# Patient Record
Sex: FEMALE | Employment: FULL TIME | URBAN - METROPOLITAN AREA
[De-identification: names, ages, dates, MRNs, and addresses within clinical notes are randomized per-mention and may not be internally consistent; named-entity substitution may affect disease eponyms.]

---

## 2017-03-19 ENCOUNTER — APPOINTMENT (OUTPATIENT)
Dept: GENERAL RADIOLOGY | Facility: CLINIC | Age: 62
End: 2017-03-19
Attending: EMERGENCY MEDICINE
Payer: COMMERCIAL

## 2017-03-19 ENCOUNTER — HOSPITAL ENCOUNTER (EMERGENCY)
Facility: CLINIC | Age: 62
Discharge: HOME | End: 2017-03-19
Attending: EMERGENCY MEDICINE | Admitting: EMERGENCY MEDICINE
Payer: COMMERCIAL

## 2017-03-19 VITALS
HEIGHT: 64 IN | BODY MASS INDEX: 34.15 KG/M2 | HEART RATE: 86 BPM | WEIGHT: 200 LBS | TEMPERATURE: 99.1 F | SYSTOLIC BLOOD PRESSURE: 174 MMHG | DIASTOLIC BLOOD PRESSURE: 61 MMHG | OXYGEN SATURATION: 97 % | RESPIRATION RATE: 18 BRPM

## 2017-03-19 DIAGNOSIS — J20.9 ACUTE BRONCHITIS, UNSPECIFIED ORGANISM: ICD-10-CM

## 2017-03-19 DIAGNOSIS — J06.9 UPPER RESPIRATORY TRACT INFECTION, UNSPECIFIED TYPE: ICD-10-CM

## 2017-03-19 DIAGNOSIS — E87.6 HYPOKALEMIA: ICD-10-CM

## 2017-03-19 LAB
ANION GAP SERPL CALCULATED.3IONS-SCNC: 11 MMOL/L (ref 3–14)
BASOPHILS # BLD AUTO: 0.1 10E9/L (ref 0–0.2)
BASOPHILS NFR BLD AUTO: 0.6 %
BUN SERPL-MCNC: 15 MG/DL (ref 7–30)
CALCIUM SERPL-MCNC: 8.5 MG/DL (ref 8.5–10.1)
CHLORIDE SERPL-SCNC: 106 MMOL/L (ref 94–109)
CO2 SERPL-SCNC: 24 MMOL/L (ref 20–32)
CREAT SERPL-MCNC: 0.89 MG/DL (ref 0.52–1.04)
DIFFERENTIAL METHOD BLD: NORMAL
EOSINOPHIL # BLD AUTO: 0.1 10E9/L (ref 0–0.7)
EOSINOPHIL NFR BLD AUTO: 1.5 %
ERYTHROCYTE [DISTWIDTH] IN BLOOD BY AUTOMATED COUNT: 11.6 % (ref 10–15)
GFR SERPL CREATININE-BSD FRML MDRD: 64 ML/MIN/1.7M2
GLUCOSE SERPL-MCNC: 99 MG/DL (ref 70–99)
HCT VFR BLD AUTO: 43.2 % (ref 35–47)
HGB BLD-MCNC: 14.7 G/DL (ref 11.7–15.7)
IMM GRANULOCYTES # BLD: 0 10E9/L (ref 0–0.4)
IMM GRANULOCYTES NFR BLD: 0.4 %
LYMPHOCYTES # BLD AUTO: 1.6 10E9/L (ref 0.8–5.3)
LYMPHOCYTES NFR BLD AUTO: 20.2 %
MCH RBC QN AUTO: 31.5 PG (ref 26.5–33)
MCHC RBC AUTO-ENTMCNC: 34 G/DL (ref 31.5–36.5)
MCV RBC AUTO: 93 FL (ref 78–100)
MONOCYTES # BLD AUTO: 1.2 10E9/L (ref 0–1.3)
MONOCYTES NFR BLD AUTO: 14.7 %
NEUTROPHILS # BLD AUTO: 5 10E9/L (ref 1.6–8.3)
NEUTROPHILS NFR BLD AUTO: 62.6 %
NRBC # BLD AUTO: 0 10*3/UL
NRBC BLD AUTO-RTO: 0 /100
PLATELET # BLD AUTO: 196 10E9/L (ref 150–450)
POTASSIUM SERPL-SCNC: 3.1 MMOL/L (ref 3.4–5.3)
RBC # BLD AUTO: 4.66 10E12/L (ref 3.8–5.2)
SODIUM SERPL-SCNC: 141 MMOL/L (ref 133–144)
WBC # BLD AUTO: 8 10E9/L (ref 4–11)

## 2017-03-19 PROCEDURE — 96360 HYDRATION IV INFUSION INIT: CPT

## 2017-03-19 PROCEDURE — 85025 COMPLETE CBC W/AUTO DIFF WBC: CPT | Performed by: EMERGENCY MEDICINE

## 2017-03-19 PROCEDURE — 99285 EMERGENCY DEPT VISIT HI MDM: CPT | Mod: 25

## 2017-03-19 PROCEDURE — 94640 AIRWAY INHALATION TREATMENT: CPT

## 2017-03-19 PROCEDURE — 93005 ELECTROCARDIOGRAM TRACING: CPT

## 2017-03-19 PROCEDURE — 71020 XR CHEST 2 VW: CPT

## 2017-03-19 PROCEDURE — 96361 HYDRATE IV INFUSION ADD-ON: CPT

## 2017-03-19 PROCEDURE — 25000132 ZZH RX MED GY IP 250 OP 250 PS 637: Performed by: EMERGENCY MEDICINE

## 2017-03-19 PROCEDURE — 80048 BASIC METABOLIC PNL TOTAL CA: CPT | Performed by: EMERGENCY MEDICINE

## 2017-03-19 PROCEDURE — 25000125 ZZHC RX 250: Performed by: EMERGENCY MEDICINE

## 2017-03-19 PROCEDURE — 25000132 ZZH RX MED GY IP 250 OP 250 PS 637

## 2017-03-19 PROCEDURE — 94640 AIRWAY INHALATION TREATMENT: CPT | Mod: 76

## 2017-03-19 PROCEDURE — 25000128 H RX IP 250 OP 636: Performed by: EMERGENCY MEDICINE

## 2017-03-19 RX ORDER — ALBUTEROL SULFATE 90 UG/1
2 AEROSOL, METERED RESPIRATORY (INHALATION) EVERY 4 HOURS PRN
Qty: 1 INHALER | Refills: 0 | Status: SHIPPED | OUTPATIENT
Start: 2017-03-19 | End: 2018-01-15

## 2017-03-19 RX ORDER — POTASSIUM CHLORIDE 1500 MG/1
40 TABLET, EXTENDED RELEASE ORAL ONCE
Status: COMPLETED | OUTPATIENT
Start: 2017-03-19 | End: 2017-03-19

## 2017-03-19 RX ORDER — AZELASTINE 1 MG/ML
1 SPRAY, METERED NASAL 2 TIMES DAILY
COMMUNITY
End: 2018-01-15

## 2017-03-19 RX ORDER — PREDNISONE 20 MG/1
60 TABLET ORAL ONCE
Status: COMPLETED | OUTPATIENT
Start: 2017-03-19 | End: 2017-03-19

## 2017-03-19 RX ORDER — ACETAMINOPHEN 325 MG/1
TABLET ORAL
Status: COMPLETED
Start: 2017-03-19 | End: 2017-03-19

## 2017-03-19 RX ORDER — PREDNISONE 20 MG/1
40 TABLET ORAL DAILY
Qty: 10 TABLET | Refills: 0 | Status: SHIPPED | OUTPATIENT
Start: 2017-03-19 | End: 2017-03-24

## 2017-03-19 RX ORDER — HYDROCODONE BITARTRATE AND ACETAMINOPHEN 5; 325 MG/1; MG/1
1-2 TABLET ORAL EVERY 4 HOURS PRN
Qty: 15 TABLET | Refills: 0 | Status: SHIPPED | OUTPATIENT
Start: 2017-03-19 | End: 2017-08-04

## 2017-03-19 RX ORDER — OXYCODONE HYDROCHLORIDE 5 MG/1
5 TABLET ORAL ONCE
Status: COMPLETED | OUTPATIENT
Start: 2017-03-19 | End: 2017-03-19

## 2017-03-19 RX ORDER — SODIUM CHLORIDE 9 MG/ML
1000 INJECTION, SOLUTION INTRAVENOUS CONTINUOUS
Status: DISCONTINUED | OUTPATIENT
Start: 2017-03-19 | End: 2017-03-20 | Stop reason: HOSPADM

## 2017-03-19 RX ORDER — ACETAMINOPHEN 325 MG/1
650 TABLET ORAL ONCE
Status: COMPLETED | OUTPATIENT
Start: 2017-03-19 | End: 2017-03-19

## 2017-03-19 RX ORDER — GUAIFENESIN AND CODEINE PHOSPHATE 100; 10 MG/5ML; MG/5ML
10 SOLUTION ORAL
Qty: 120 ML | Refills: 0 | Status: SHIPPED | OUTPATIENT
Start: 2017-03-19 | End: 2017-03-19

## 2017-03-19 RX ORDER — LIDOCAINE 40 MG/G
CREAM TOPICAL
Status: DISCONTINUED | OUTPATIENT
Start: 2017-03-19 | End: 2017-03-20 | Stop reason: HOSPADM

## 2017-03-19 RX ORDER — IPRATROPIUM BROMIDE AND ALBUTEROL SULFATE 2.5; .5 MG/3ML; MG/3ML
3 SOLUTION RESPIRATORY (INHALATION)
Status: COMPLETED | OUTPATIENT
Start: 2017-03-19 | End: 2017-03-19

## 2017-03-19 RX ADMIN — IPRATROPIUM BROMIDE AND ALBUTEROL SULFATE 3 ML: .5; 3 SOLUTION RESPIRATORY (INHALATION) at 18:31

## 2017-03-19 RX ADMIN — POTASSIUM CHLORIDE 40 MEQ: 1500 TABLET, EXTENDED RELEASE ORAL at 22:00

## 2017-03-19 RX ADMIN — SODIUM CHLORIDE 1000 ML: 9 INJECTION, SOLUTION INTRAVENOUS at 19:57

## 2017-03-19 RX ADMIN — ACETAMINOPHEN 650 MG: 325 TABLET ORAL at 18:16

## 2017-03-19 RX ADMIN — PREDNISONE 60 MG: 20 TABLET ORAL at 19:57

## 2017-03-19 RX ADMIN — IPRATROPIUM BROMIDE AND ALBUTEROL SULFATE 3 ML: .5; 3 SOLUTION RESPIRATORY (INHALATION) at 17:57

## 2017-03-19 RX ADMIN — OXYCODONE HYDROCHLORIDE 5 MG: 5 TABLET ORAL at 21:59

## 2017-03-19 RX ADMIN — IPRATROPIUM BROMIDE AND ALBUTEROL SULFATE 3 ML: .5; 3 SOLUTION RESPIRATORY (INHALATION) at 18:24

## 2017-03-19 RX ADMIN — ACETAMINOPHEN 650 MG: 325 TABLET, FILM COATED ORAL at 18:16

## 2017-03-19 ASSESSMENT — ENCOUNTER SYMPTOMS
COUGH: 1
FEVER: 0
NAUSEA: 0
DIZZINESS: 1
CHILLS: 0
VOMITING: 0
SHORTNESS OF BREATH: 1
FATIGUE: 1

## 2017-03-19 NOTE — ED NOTES
Coughing and shortness of breath for the past 3 days.  Patient alert and oriented x3.  Airway, breathing and circulation intact.

## 2017-03-19 NOTE — ED AVS SNAPSHOT
[facility]    [address]    [facility] [address]    Phone:  [phone]    Fax:  [phone]                                       Lilia RAMIREZ   MRN: [id number]    Department:  [facility]   Date of Visit:  3/19/2017           After Visit Summary Signature Page     I have received my discharge instructions, and my questions have been answered. I have discussed any challenges I see with this plan with the nurse or doctor.    ..........................................................................................................................................  Patient/Patient Representative Signature      ..........................................................................................................................................  Patient Representative Print Name and Relationship to Patient    ..................................................               ................................................  Date                                            Time    ..........................................................................................................................................  Reviewed by Signature/Title    ...................................................              ..............................................  Date                                                            Time

## 2017-03-19 NOTE — ED PROVIDER NOTES
History     Chief Complaint:  Cough    HPI   Lilia LILLY is a 61 year old female with a history of hypertension and chronic sinus issues who presents with daughter to the emergency department today for evaluation of cough. Ms. Lilly reports for the last 3 days she has been experiencing dry cough with shortness of breath. Yesterday, patient began to experience lightheadedness and fatigue. Of note, patient notes current symptoms are not similar to her chronic sinus infections. Due to worsening symptoms, patient comes in for evaluation. Patient has been taking Tylenol with last today after returning from work. She also reports pain in her lateral bilateral chest wall and back with coughing. Patient notes she hasn't smoked for the last 2 days due to symptoms. No history of inhaler use or diagnosis of asthma although reports that she was on prednisone in December for bronchitis. The patient denies any fever, chills, diaphoresis,  chest pain, nausea, vomiting, or leg swelling or pain. She reports no recent illness or sick contacts.    Allergies:  NKDA     Medications:    Lisinopril Po  Paroxetine Hcl (Paxil Po)  Clonidine Hcl Po  Fluticasone (Veramyst) 27.5 Mcg/Spray Spray  Ascorbic Acid (Vitamin C Po)  Vitamin D, Cholecalciferol, Po     Past Medical History:    Hypertension    Past Surgical History:    Cholecystectomy    Family History:    History reviewed. No pertinent family history.    Social History:  Marital Status:   [4]  Tobacco: Negative  Alcohol: Negative  PCP: [facility]  Presents with daughter    Review of Systems   Constitutional: Positive for fatigue. Negative for chills and fever.   Respiratory: Positive for cough and shortness of breath.    Cardiovascular: Negative for chest pain and leg swelling.   Gastrointestinal: Negative for nausea and vomiting.   Neurological: Positive for dizziness.   All other systems reviewed and are negative.    Physical Exam   First Vitals:  BP:  "165/90  Pulse: 98  Heart Rate: 98  Temp: 97.3  F (36.3  C)  Resp: 22  Height: 162.6 cm (5' 4\")  Weight: 90.7 kg (200 lb)  SpO2: 98 %    Physical Exam  Nursing note and vitals reviewed.    Constitutional: Pleasant and well groomed. Appears to feel poorly. Frequent dry cough.          HENT:    Mouth/Throat: Oropharynx is without swelling or erythema. Oral mucosa moist.    Eyes: Conjunctivae normal are normal. No scleral icterus.    Neck: Neck supple.    Ears:  Left TM injected.   Cardiovascular: Normal rate, regular rhythm and intact distal pulses.    Pulmonary/Chest: Diffuse end expiratory wheezing.   Abdominal: Soft.  No distension. There is no tenderness.   Musculoskeletal:  No edema, No calf tenderness  Neurological:Alert and oriented. Coordination normal. Upper and lower extremity strength intact throughout.   Skin: Skin is warm and dry. No rash.   Psychiatric: Normal mood and affect.     Emergency Department Course   Imaging:  Chest X-Ray. 2 Views:   No active infiltrate.   Final reading per radiology.     Laboratory:  CBC:  WBC 8.0, HGB 14.7, , otherwise WNL  BMP: Potassium 3.1 (L) o/w WNL. (Creatinine 0.89)    Interventions:  17:57 Duoneb, 3 mL, nebulization   18:16 Tylenol 650 mg Oral  18:24 Duoneb, 3 mL, nebulization   18:31 Duoneb, 3 mL, nebulization   19:57 Prednisone 60 mg Oral  21:59 Oxycodone 5 mg Oral  22:00 Potassium Chloride 40 mEq Oral    Emergency Department Course:  Nursing notes and vitals reviewed.    17:19 I performed an exam of the patient as documented above.     The patient received the intervention(s) above.    18:18 Nurse reports patient's symptoms did not improve after Duo Neb. Patient also reports that her symptoms are similar to her sinus infection, which she denied at first.     18:21 I reevaluated the patient. Patient now has increased wheezing after the first Neb.     19:22 I reevaluated the patient. Her wheezing resolved, but now patient may have subtle rales in the left " base. She reports to continue to feel poorly.     20:00 Blood drawn. This was sent to the lab for further testing, results above.    The patient was taken for x-ray, see imaging results above.     20:21 Recheck patient.     21:09 Recheck patient. I personally reviewed the laboratory results with the Patient and daughter and answered all related questions prior to discharge. Plan explained to the Patient and daughter. Patient discharged home with instructions regarding supportive care, medications, and reasons to return. The importance of close follow-up was reviewed.  Impression & Plan    Medical Decision Making:  Lilia RAMIREZ is a 61 year old female presents with cough, sinus congestion, pain in her chest and back from coughing and feeling wheezy and short of breath as described above. Differential diagnosis included, but not limited to, reactive airway disease, early COPD, bronchitis, pneumonia, viral syndrome, and sinusitis. ED evaluation is as noted above. She had resolution of the wheezing with 3 Nebs in the ED, but still reported the cough persistent and feeling lousy as well as lightheaded. After IV fluids, the lightheaded improved although the cough continued to be bothersome. The chest x-0ray did not show pneumonia, which was obtained with the possible rales auscultated after the wheezing resolved. Her labs revealed mild hypokalemia and potassium was administered. She was instructed on the use of albuterol MDI with a spaced. We instructed on the importance of working on smoking cessation and recommended close follow up with PCP in the next few days for repeat assessment.      Diagnosis:    ICD-10-CM    1. Acute bronchitis, unspecified organism J20.9 Basic metabolic panel   2. Upper respiratory tract infection, unspecified type J06.9    3. Hypokalemia E87.6     mild       Disposition:  discharged to home    Discharge Medications:  New Prescriptions    ALBUTEROL (ALBUTEROL) 108 (90 BASE) MCG/ACT INHALER     Inhale 2 puffs into the lungs every 4 hours as needed for shortness of breath / dyspnea    HYDROCODONE-ACETAMINOPHEN (NORCO) 5-325 MG PER TABLET    Take 1-2 tablets by mouth every 4 hours as needed for moderate to severe pain    PREDNISONE (DELTASONE) 20 MG TABLET    Take 2 tablets (40 mg) by mouth daily for 5 days       Scribe Disclosure:  I, Peggy Guajardo, am serving as a scribe at 5:19 PM on 3/19/2017 to document services personally performed by Rocío Leone MD, based on my observations and the provider's statements to me.  Peggy Guajardo  3/19/2017   [facility] EMERGENCY DEPARTMENT       Rocío Leone MD  03/19/17 8921

## 2017-08-04 ENCOUNTER — RADIANT APPOINTMENT (OUTPATIENT)
Dept: MAMMOGRAPHY | Facility: CLINIC | Age: 62
End: 2017-08-04
Attending: PEDIATRICS
Payer: COMMERCIAL

## 2017-08-04 ENCOUNTER — OFFICE VISIT (OUTPATIENT)
Dept: PEDIATRICS | Facility: CLINIC | Age: 62
End: 2017-08-04
Payer: COMMERCIAL

## 2017-08-04 VITALS
HEART RATE: 73 BPM | BODY MASS INDEX: 36.7 KG/M2 | HEIGHT: 64 IN | OXYGEN SATURATION: 96 % | SYSTOLIC BLOOD PRESSURE: 130 MMHG | DIASTOLIC BLOOD PRESSURE: 80 MMHG | WEIGHT: 215 LBS | TEMPERATURE: 98.5 F

## 2017-08-04 DIAGNOSIS — Z13.220 LIPID SCREENING: ICD-10-CM

## 2017-08-04 DIAGNOSIS — Z12.31 VISIT FOR SCREENING MAMMOGRAM: ICD-10-CM

## 2017-08-04 DIAGNOSIS — Z00.00 ROUTINE GENERAL MEDICAL EXAMINATION AT A HEALTH CARE FACILITY: Primary | ICD-10-CM

## 2017-08-04 DIAGNOSIS — Z23 NEED FOR ZOSTER VACCINATION: ICD-10-CM

## 2017-08-04 DIAGNOSIS — E66.01 MORBID OBESITY, UNSPECIFIED OBESITY TYPE (H): ICD-10-CM

## 2017-08-04 DIAGNOSIS — I10 BENIGN ESSENTIAL HYPERTENSION: ICD-10-CM

## 2017-08-04 DIAGNOSIS — R61 GENERALIZED HYPERHIDROSIS: ICD-10-CM

## 2017-08-04 DIAGNOSIS — J31.0 NON-ALLERGIC RHINITIS: ICD-10-CM

## 2017-08-04 DIAGNOSIS — K51.019 ULCERATIVE PANCOLITIS WITH COMPLICATION (H): ICD-10-CM

## 2017-08-04 DIAGNOSIS — F33.1 MAJOR DEPRESSIVE DISORDER, RECURRENT EPISODE, MODERATE (H): ICD-10-CM

## 2017-08-04 PROBLEM — R73.03 PREDIABETES: Status: ACTIVE | Noted: 2017-08-04

## 2017-08-04 PROBLEM — G47.33 OBSTRUCTIVE SLEEP APNEA: Status: ACTIVE | Noted: 2017-08-02

## 2017-08-04 PROBLEM — H90.3 SENSORINEURAL HEARING LOSS, ASYMMETRICAL: Status: ACTIVE | Noted: 2017-04-13

## 2017-08-04 PROBLEM — H93.12 LEFT-SIDED TINNITUS: Status: ACTIVE | Noted: 2017-04-13

## 2017-08-04 LAB
ANION GAP SERPL CALCULATED.3IONS-SCNC: 7 MMOL/L (ref 3–14)
BUN SERPL-MCNC: 13 MG/DL (ref 7–30)
CALCIUM SERPL-MCNC: 9.1 MG/DL (ref 8.5–10.1)
CHLORIDE SERPL-SCNC: 104 MMOL/L (ref 94–109)
CHOLEST SERPL-MCNC: 224 MG/DL
CO2 SERPL-SCNC: 28 MMOL/L (ref 20–32)
CREAT SERPL-MCNC: 0.86 MG/DL (ref 0.52–1.04)
GFR SERPL CREATININE-BSD FRML MDRD: 67 ML/MIN/1.7M2
GLUCOSE SERPL-MCNC: 102 MG/DL (ref 70–99)
HDLC SERPL-MCNC: 50 MG/DL
LDLC SERPL CALC-MCNC: 133 MG/DL
NONHDLC SERPL-MCNC: 174 MG/DL
POTASSIUM SERPL-SCNC: 4.3 MMOL/L (ref 3.4–5.3)
SODIUM SERPL-SCNC: 139 MMOL/L (ref 133–144)
TRIGL SERPL-MCNC: 206 MG/DL

## 2017-08-04 PROCEDURE — 80048 BASIC METABOLIC PNL TOTAL CA: CPT | Performed by: PEDIATRICS

## 2017-08-04 PROCEDURE — 99386 PREV VISIT NEW AGE 40-64: CPT | Mod: 25 | Performed by: PEDIATRICS

## 2017-08-04 PROCEDURE — 36415 COLL VENOUS BLD VENIPUNCTURE: CPT | Performed by: PEDIATRICS

## 2017-08-04 PROCEDURE — 80061 LIPID PANEL: CPT | Performed by: PEDIATRICS

## 2017-08-04 PROCEDURE — 90471 IMMUNIZATION ADMIN: CPT | Performed by: PEDIATRICS

## 2017-08-04 PROCEDURE — G0202 SCR MAMMO BI INCL CAD: HCPCS | Mod: TC

## 2017-08-04 PROCEDURE — 90736 HZV VACCINE LIVE SUBQ: CPT | Performed by: PEDIATRICS

## 2017-08-04 RX ORDER — CLONIDINE HYDROCHLORIDE 0.2 MG/1
0.2 TABLET ORAL 2 TIMES DAILY
Qty: 180 TABLET | Refills: 3 | Status: SHIPPED | OUTPATIENT
Start: 2017-08-04 | End: 2018-08-06

## 2017-08-04 RX ORDER — PAROXETINE 40 MG/1
40 TABLET, FILM COATED ORAL AT BEDTIME
Qty: 90 TABLET | Refills: 1 | Status: SHIPPED | OUTPATIENT
Start: 2017-08-04 | End: 2017-09-25

## 2017-08-04 RX ORDER — LISINOPRIL 10 MG/1
10 TABLET ORAL DAILY
Qty: 90 TABLET | Refills: 3 | Status: SHIPPED | OUTPATIENT
Start: 2017-08-04 | End: 2017-09-22 | Stop reason: SINTOL

## 2017-08-04 NOTE — NURSING NOTE
"Chief Complaint   Patient presents with     Physical     Establish Care       Initial /80 (BP Location: Right arm, Cuff Size: Adult Large)  Pulse 73  Temp 98.5  F (36.9  C) (Oral)  Ht 5' 4\" (1.626 m)  Wt 215 lb (97.5 kg)  SpO2 96%  BMI 36.9 kg/m2 Estimated body mass index is 36.9 kg/(m^2) as calculated from the following:    Height as of this encounter: 5' 4\" (1.626 m).    Weight as of this encounter: 215 lb (97.5 kg).  Medication Reconciliation: complete   Kenia Arora MA    1.  Has the patient received the information for the Zostavax vaccine? YES    2.  Does the patient have any of the following contraindications?     Allergy to Neomycin or Gelatin? No       Current moderate or severe illness? No  Temp = 98.5  If temp > 99.0 degrees orally or patient has above allergies, vaccine is deferred    3.  The vaccine has been administered in the usual fashion and the patient was instructed to wait 20 minutes before leaving the building in the event of an allergic reaction: YES    Vaccination given by Kenia Arora MA  .  Recorded by Kenia Arora        "

## 2017-08-04 NOTE — MR AVS SNAPSHOT
After Visit Summary   8/4/2017    Lilia RAMIREZ    MRN: [id number]           Patient Information     Date Of Birth          1955        Visit Information        Provider Department      8/4/2017 8:40 AM Teagan Ann MD [facility]        Today's Diagnoses     Visit for screening mammogram    -  1    Lipid screening        Benign essential hypertension        Generalized hyperhidrosis        Major depressive disorder, recurrent episode, moderate (H)        Non-allergic rhinitis          Care Instructions    Schedule mammogram at [facility] [phone]      Preventive Health Recommendations  Female Ages 50 - 64    Yearly exam: See your health care provider every year in order to  o Review health changes.   o Discuss preventive care.    o Review your medicines if your doctor has prescribed any.      Get a Pap test every three years (unless you have an abnormal result and your provider advises testing more often).    If you get Pap tests with HPV test, you only need to test every 5 years, unless you have an abnormal result.     You do not need a Pap test if your uterus was removed (hysterectomy) and you have not had cancer.    You should be tested each year for STDs (sexually transmitted diseases) if you're at risk.     Have a mammogram every 1 to 2 years.    Have a colonoscopy at age 50, or have a yearly FIT test (stool test). These exams screen for colon cancer.      Have a cholesterol test every 5 years, or more often if advised.    Have a diabetes test (fasting glucose) every three years. If you are at risk for diabetes, you should have this test more often.     If you are at risk for osteoporosis (brittle bone disease), think about having a bone density scan (DEXA).    Shots: Get a flu shot each year. Get a tetanus shot every 10 years.    Nutrition:     Eat at least 5 servings of fruits and vegetables each day.    Eat whole-grain bread, whole-wheat pasta and brown  "rice instead of white grains and rice.    Talk to your provider about Calcium and Vitamin D.     Lifestyle    Exercise at least 150 minutes a week (30 minutes a day, 5 days a week). This will help you control your weight and prevent disease.    Limit alcohol to one drink per day.    No smoking.     Wear sunscreen to prevent skin cancer.     See your dentist every six months for an exam and cleaning.    See your eye doctor every 1 to 2 years.            Follow-ups after your visit        Future tests that were ordered for you today     Open Future Orders        Priority Expected Expires Ordered    MA Screening Digital Bilateral Routine  8/4/2018 8/4/2017            Who to contact     If you have questions or need follow up information about today's clinic visit or your schedule please contact [facility] directly at [phone].  Normal or non-critical lab and imaging results will be communicated to you by MyChart, letter or phone within 4 business days after the clinic has received the results. If you do not hear from us within 7 days, please contact the clinic through MyChart or phone. If you have a critical or abnormal lab result, we will notify you by phone as soon as possible.  Submit refill requests through [employer] or call your pharmacy and they will forward the refill request to us. Please allow 3 business days for your refill to be completed.          Additional Information About Your Visit        [employer] Information     [employer] lets you send messages to your doctor, view your test results, renew your prescriptions, schedule appointments and more. To sign up, go to [email or web address] . Click on \"Log in\" on the left side of the screen, which will take you to the Welcome page. Then click on \"Sign up Now\" on the right side of the page.     You will be asked to enter the access code listed below, as well as some personal information. Please follow the directions to create your username and " "password.     Your access code is: [email or web address]  Expires: 2017  9:36 AM     Your access code will  in 90 days. If you need help or a new code, please call your [facility] clinic or [phone].        Care EveryWhere ID     This is your Care EveryWhere ID. This could be used by other organizations to access your [facility] medical records  [id number]        Your Vitals Were     Pulse Temperature Height Pulse Oximetry BMI (Body Mass Index)       73 98.5  F (36.9  C) (Oral) 5' 4\" (1.626 m) 96% 36.9 kg/m2        Blood Pressure from Last 3 Encounters:   17 150/80   17 174/61    Weight from Last 3 Encounters:   17 215 lb (97.5 kg)   17 200 lb (90.7 kg)              We Performed the Following     Basic metabolic panel     Lipid Profile (Chol, Trig, HDL, LDL calc)          Today's Medication Changes          These changes are accurate as of: 17  9:36 AM.  If you have any questions, ask your nurse or doctor.               These medicines have changed or have updated prescriptions.        Dose/Directions    * CLONIDINE HCL PO   This may have changed:  Another medication with the same name was added. Make sure you understand how and when to take each.        Refills:  0       * cloNIDine 0.2 MG tablet   Commonly known as:  CATAPRES   This may have changed:  You were already taking a medication with the same name, and this prescription was added. Make sure you understand how and when to take each.   Used for:  Generalized hyperhidrosis   Changed by:  Teagan Ann MD        Dose:  0.2 mg   Take 1 tablet (0.2 mg) by mouth 2 times daily   Quantity:  180 tablet   Refills:  3       * fluticasone 27.5 MCG/SPRAY spray   Commonly known as:  VERAMYST   This may have changed:  Another medication with the same name was added. Make sure you understand how and when to take each.        Dose:  2 spray   Spray 2 sprays into both nostrils daily   Refills:  0       * fluticasone 27.5 MCG/SPRAY spray "   Commonly known as:  VERAMYST   This may have changed:  You were already taking a medication with the same name, and this prescription was added. Make sure you understand how and when to take each.   Used for:  Non-allergic rhinitis   Changed by:  Teagan Ann MD        Dose:  1-2 spray   Spray 1-2 sprays into both nostrils daily   Quantity:  10 g   Refills:  11       * LISINOPRIL PO   This may have changed:  Another medication with the same name was added. Make sure you understand how and when to take each.        Refills:  0       * lisinopril 10 MG tablet   Commonly known as:  PRINIVIL/ZESTRIL   This may have changed:  You were already taking a medication with the same name, and this prescription was added. Make sure you understand how and when to take each.   Used for:  Benign essential hypertension   Changed by:  Teagan Ann MD        Dose:  10 mg   Take 1 tablet (10 mg) by mouth daily   Quantity:  90 tablet   Refills:  3       * PAXIL PO   This may have changed:  Another medication with the same name was added. Make sure you understand how and when to take each.        Take by mouth daily   Refills:  0       * PARoxetine 40 MG tablet   Commonly known as:  PAXIL   This may have changed:  You were already taking a medication with the same name, and this prescription was added. Make sure you understand how and when to take each.   Used for:  Major depressive disorder, recurrent episode, moderate (H)   Changed by:  Teagan Ann MD        Dose:  40 mg   Take 1 tablet (40 mg) by mouth At Bedtime   Quantity:  90 tablet   Refills:  1       * Notice:  This list has 8 medication(s) that are the same as other medications prescribed for you. Read the directions carefully, and ask your doctor or other care provider to review them with you.         Where to get your medicines      These medications were sent to [id number] - [place], [place] - [address] AT [facility]  [address], [place] [address]     Phone:  [phone]     cloNIDine 0.2 MG tablet    fluticasone 27.5 MCG/SPRAY spray    lisinopril 10 MG tablet    PARoxetine 40 MG tablet                Primary Care Provider Office Phone # Fax #    Bridget Pineda [phone]       [facility] [address]  [facility] [address]        Equal Access to Services     MAURICIO JORGENSEN : Hadii aad ku hadasho Soomaali, waaxda luqadaha, qaybta kaalmada adeegyada, waxay idiin hayaan adeeg kharash la'aan . So [facility] [phone].    ATENCIÓN: Si nathaliala espsamra, tiene a wyatt disposición servicios gratuitos de asistencia lingüística. Jett al [phone].    We comply with applicable federal civil rights laws and [place] laws. We do not discriminate on the basis of race, color, national origin, age, disability sex, sexual orientation or gender identity.            Thank you!     Thank you for choosing [facility]  for your care. Our goal is always to provide you with excellent care. Hearing back from our patients is one way we can continue to improve our services. Please take a few minutes to complete the written survey that you may receive in the mail after your visit with us. Thank you!             Your Updated Medication List - Protect others around you: Learn how to safely use, store and throw away your medicines at www.disposemymeds.org.          This list is accurate as of: 8/4/17  9:36 AM.  Always use your most recent med list.                   Brand Name Dispense Instructions for use Diagnosis    albuterol 108 (90 BASE) MCG/ACT Inhaler    albuterol    1 Inhaler    Inhale 2 puffs into the lungs every 4 hours as needed for shortness of breath / dyspnea        azelastine 0.1 % spray    ASTELIN     Spray 1 spray into both nostrils 2 times daily        * CLONIDINE HCL PO           * cloNIDine 0.2 MG tablet    CATAPRES    180 tablet    Take 1 tablet (0.2 mg) by mouth 2 times daily    Generalized  hyperhidrosis       * fluticasone 27.5 MCG/SPRAY spray    VERAMYST     Spray 2 sprays into both nostrils daily        * fluticasone 27.5 MCG/SPRAY spray    VERAMYST    10 g    Spray 1-2 sprays into both nostrils daily    Non-allergic rhinitis       * LISINOPRIL PO           * lisinopril 10 MG tablet    PRINIVIL/ZESTRIL    90 tablet    Take 1 tablet (10 mg) by mouth daily    Benign essential hypertension       * PAXIL PO      Take by mouth daily        * PARoxetine 40 MG tablet    PAXIL    90 tablet    Take 1 tablet (40 mg) by mouth At Bedtime    Major depressive disorder, recurrent episode, moderate (H)       VITAMIN C PO           VITAMIN D (CHOLECALCIFEROL) PO      Take by mouth daily        * Notice:  This list has 8 medication(s) that are the same as other medications prescribed for you. Read the directions carefully, and ask your doctor or other care provider to review them with you.

## 2017-08-04 NOTE — PROGRESS NOTES
SUBJECTIVE:   CC: Lilia RAMIREZ is an 62 year old woman who presents for preventive health visit.     Physical   Annual:     Getting at least 3 servings of Calcium per day::  Yes    Bi-annual eye exam::  Yes    Dental care twice a year::  Yes    Sleep apnea or symptoms of sleep apnea::  Sleep apnea    Frequency of exercise::  1 day/week    Duration of exercise::  Greater than 60 minutes    Taking medications regularly::  Yes    Medication side effects::  Muscle aches and Significant flushing      Patient new to clinic, switching from [facility].    -Had mammo and PAP last year at [facility].    -Patient states she has ulcerative colitis - but cannot recall which GI she previously went to or when her last colonoscopy was.    She also has depression for which she takes paxil and has been stable.  Hypertension - stable on lisinopril - states with higher doses she gets a cough  Has chronic sinus issues - has seen ENT in the past - using flonase.  Hyperhydrosis controlled with clonidine      Today's PHQ-2 Score:   PHQ-2 ( 1999 Pfizer) 8/4/2017   Q1: Little interest or pleasure in doing things 1   Q2: Feeling down, depressed or hopeless 1   PHQ-2 Score 2   Q1: Little interest or pleasure in doing things Several days   Q2: Feeling down, depressed or hopeless Several days   PHQ-2 Score 2       Abuse: Current or Past(Physical, Sexual or Emotional)- No  Do you feel safe in your environment - Yes    Social History   Substance Use Topics     Smoking status: Former Smoker     Types: Cigarettes     Quit date: 7/21/2017     Smokeless tobacco: Never Used     Alcohol use Yes      Comment: social drinker     The patient does not drink >3 drinks per day nor >7 drinks per week.    Reviewed orders with patient.  Reviewed health maintenance and updated orders accordingly - Yes      Patient over age 50, mutual decision to screen reflected in health maintenance.      Pertinent mammograms are reviewed under the imaging tab.  History of abnormal  "Pap smear: NO - age 30- 65 PAP every 3 years recommended    Reviewed and updated as needed this visit by clinical staff  Tobacco  Allergies  Meds  Med Hx  Surg Hx  Fam Hx  Soc Hx        Reviewed and updated as needed this visit by Provider            ROS:  C: NEGATIVE for fever, chills, change in weight  I: NEGATIVE for worrisome rashes, moles or lesions  E: NEGATIVE for vision changes or irritation  ENT: NEGATIVE for ear, mouth and throat problems  R: NEGATIVE for significant cough or SOB  B: NEGATIVE for masses, tenderness or discharge  CV: NEGATIVE for chest pain, palpitations or peripheral edema  GI: NEGATIVE for nausea, abdominal pain, heartburn, or change in bowel habits  : NEGATIVE for unusual urinary or vaginal symptoms. No vaginal bleeding.  M: NEGATIVE for significant arthralgias or myalgia  N: NEGATIVE for weakness, dizziness or paresthesias  P: NEGATIVE for changes in mood or affect      OBJECTIVE:   /80  Pulse 73  Temp 98.5  F (36.9  C) (Oral)  Ht 5' 4\" (1.626 m)  Wt 215 lb (97.5 kg)  SpO2 96%  BMI 36.9 kg/m2  EXAM:  GENERAL APPEARANCE: healthy, alert and no distress  EYES: Eyes grossly normal to inspection, PERRL and conjunctivae and sclerae normal  HENT: ear canals and TM's normal, nose and mouth without ulcers or lesions, oropharynx clear and oral mucous membranes moist  NECK: no adenopathy, no asymmetry, masses, or scars and thyroid normal to palpation  RESP: lungs clear to auscultation - no rales, rhonchi or wheezes  BREAST: normal without masses, tenderness or nipple discharge and no palpable axillary masses or adenopathy  CV: regular rate and rhythm, normal S1 S2, no S3 or S4, no murmur, click or rub, no peripheral edema and peripheral pulses strong  ABDOMEN: soft, nontender, no hepatosplenomegaly, no masses and bowel sounds normal  MS: no musculoskeletal defects are noted and gait is age appropriate without ataxia  SKIN: no suspicious lesions or rashes  NEURO: Normal strength " and tone, sensory exam grossly normal, mentation intact and speech normal  PSYCH: mentation appears normal and affect normal/bright    ASSESSMENT/PLAN:   1. Routine general medical examination at a health care facility    Patient mentions she would like to consider bariatric surgery, but her insurance won't cover lap banding - advised her to call back to see if they would cover any and then we can refer to bariatric.  She has BMI >35 with comorbidity=morbid obesity.    2. Major depressive disorder, recurrent episode, moderate (H)  Controlled, follow up in 6 months.  - PARoxetine (PAXIL) 40 MG tablet; Take 1 tablet (40 mg) by mouth At Bedtime  Dispense: 90 tablet; Refill: 1    3. Ulcerative pancolitis with complication (H)  Patient to find out previous GI and return [facility]. She does states she never wants to do colonoscopy again - was very painful.    4. Visit for screening mammogram  - MA Screening Digital Bilateral; Future    5. Lipid screening  - Lipid Profile (Chol, Trig, HDL, LDL calc)    6. Benign essential hypertension  Controlled, refill  - Basic metabolic panel  - lisinopril (PRINIVIL/ZESTRIL) 10 MG tablet; Take 1 tablet (10 mg) by mouth daily  Dispense: 90 tablet; Refill: 3    7. Generalized hyperhidrosis  refill  - cloNIDine (CATAPRES) 0.2 MG tablet; Take 1 tablet (0.2 mg) by mouth 2 times daily  Dispense: 180 tablet; Refill: 3    8. Non-allergic rhinitis  refill  - fluticasone (VERAMYST) 27.5 MCG/SPRAY spray; Spray 1-2 sprays into both nostrils daily  Dispense: 10 g; Refill: 11    9. Need for zoster vaccination  - ZOSTER VACC LIVE SUBQ NJX  - VACCINE ADMINISTRATION, INITIAL    COUNSELING:  Reviewed preventive health counseling, as reflected in patient instructions       Regular exercise       Healthy diet/nutrition         reports that she quit smoking about 2 weeks ago. Her smoking use included Cigarettes. She has never used smokeless tobacco.    Estimated body mass index is 36.9 kg/(m^2) as calculated from  "the following:    Height as of this encounter: 5' 4\" (1.626 m).    Weight as of this encounter: 215 lb (97.5 kg).   Weight management plan: Discussed healthy diet and exercise guidelines and patient will follow up in 12 months in clinic to re-evaluate.    Counseling Resources:  ATP IV Guidelines  Pooled Cohorts Equation Calculator  Breast Cancer Risk Calculator  FRAX Risk Assessment  ICSI Preventive Guidelines  Dietary Guidelines for Americans, 2010  USDA's MyPlate  ASA Prophylaxis  Lung CA Screening    Teagan Ann MD  [facility]  "

## 2017-08-04 NOTE — PATIENT INSTRUCTIONS
Schedule mammogram at [facility] [phone]      Preventive Health Recommendations  Female Ages 50 - 64    Yearly exam: See your health care provider every year in order to  o Review health changes.   o Discuss preventive care.    o Review your medicines if your doctor has prescribed any.      Get a Pap test every three years (unless you have an abnormal result and your provider advises testing more often).    If you get Pap tests with HPV test, you only need to test every 5 years, unless you have an abnormal result.     You do not need a Pap test if your uterus was removed (hysterectomy) and you have not had cancer.    You should be tested each year for STDs (sexually transmitted diseases) if you're at risk.     Have a mammogram every 1 to 2 years.    Have a colonoscopy at age 50, or have a yearly FIT test (stool test). These exams screen for colon cancer.      Have a cholesterol test every 5 years, or more often if advised.    Have a diabetes test (fasting glucose) every three years. If you are at risk for diabetes, you should have this test more often.     If you are at risk for osteoporosis (brittle bone disease), think about having a bone density scan (DEXA).    Shots: Get a flu shot each year. Get a tetanus shot every 10 years.    Nutrition:     Eat at least 5 servings of fruits and vegetables each day.    Eat whole-grain bread, whole-wheat pasta and brown rice instead of white grains and rice.    Talk to your provider about Calcium and Vitamin D.     Lifestyle    Exercise at least 150 minutes a week (30 minutes a day, 5 days a week). This will help you control your weight and prevent disease.    Limit alcohol to one drink per day.    No smoking.     Wear sunscreen to prevent skin cancer.     See your dentist every six months for an exam and cleaning.    See your eye doctor every 1 to 2 years.

## 2017-08-04 NOTE — LETTER
[facility]  [address]  [place] [address]                  [phone]   August 4, 2017    Lilia RAMIREZ  [address]  [place] [address]      Dear Lilia,    Here is a summary of your recent test results:    Your labwork is consistent with Prediabetes, defined as a fasting blood sugar between 100 and 125. Prediabetes puts you at risk for Adult Onset (type 2) Diabetes. You can prevent or delay the development of Type 2 Diabetes through lifestyle changes including modest weight loss and regular exercise. Reducing your weight through daily calorie restriction and modest physical activity for 30 minutes every day can often return elevated blood glucose levels to the normal range.     Your cholesterol is elevated.  Given you other risk factors, including high blood pressure, it is recommended to start a cholesterol medication.  Please make a follow up appt with me to further discuss.     Your test results are enclosed.      Please contact me if you have any questions.    Best regards,    Teagan Ann MD  [facility]        Results for orders placed or performed in visit on 08/04/17   Lipid Profile (Chol, Trig, HDL, LDL calc)   Result Value Ref Range    Cholesterol 224 (H) <200 mg/dL    Triglycerides 206 (H) <150 mg/dL    HDL Cholesterol 50 >49 mg/dL    LDL Cholesterol Calculated 133 (H) <100 mg/dL    Non HDL Cholesterol 174 (H) <130 mg/dL   Basic metabolic panel   Result Value Ref Range    Sodium 139 133 - 144 mmol/L    Potassium 4.3 3.4 - 5.3 mmol/L    Chloride 104 94 - 109 mmol/L    Carbon Dioxide 28 20 - 32 mmol/L    Anion Gap 7 3 - 14 mmol/L    Glucose 102 (H) 70 - 99 mg/dL    Urea Nitrogen 13 7 - 30 mg/dL    Creatinine 0.86 0.52 - 1.04 mg/dL    GFR Estimate 67 >60 mL/min/1.7m2    GFR Estimate If Black 81 >60 mL/min/1.7m2    Calcium 9.1 8.5 - 10.1 mg/dL

## 2017-08-05 ENCOUNTER — HEALTH MAINTENANCE LETTER (OUTPATIENT)
Age: 62
End: 2017-08-05

## 2017-08-07 ENCOUNTER — TRANSFERRED RECORDS (OUTPATIENT)
Dept: HEALTH INFORMATION MANAGEMENT | Facility: CLINIC | Age: 62
End: 2017-08-07

## 2017-08-07 ASSESSMENT — PATIENT HEALTH QUESTIONNAIRE - PHQ9: SUM OF ALL RESPONSES TO PHQ QUESTIONS 1-9: 13

## 2017-08-08 ENCOUNTER — TELEPHONE (OUTPATIENT)
Dept: PEDIATRICS | Facility: CLINIC | Age: 62
End: 2017-08-08

## 2017-08-08 DIAGNOSIS — J30.0 CHRONIC VASOMOTOR RHINITIS: Primary | ICD-10-CM

## 2017-08-08 NOTE — TELEPHONE ENCOUNTER
"Notification received from [facility] in [place] off [address].     They are requesting we change pt from Veramyst to Flonase nasal spray, as \"Veramyst is not available at this moment\"    Routing to provider for recommendations and for new script.    Marcela Lawson MA   August 8, 2017,  2:55 PM    "

## 2017-08-09 RX ORDER — FLUTICASONE PROPIONATE 50 MCG
1-2 SPRAY, SUSPENSION (ML) NASAL DAILY
Qty: 1 BOTTLE | Refills: 11 | Status: SHIPPED | OUTPATIENT
Start: 2017-08-09 | End: 2019-06-21

## 2017-08-18 ENCOUNTER — OFFICE VISIT (OUTPATIENT)
Dept: PEDIATRICS | Facility: CLINIC | Age: 62
End: 2017-08-18
Payer: COMMERCIAL

## 2017-08-18 VITALS
DIASTOLIC BLOOD PRESSURE: 84 MMHG | TEMPERATURE: 97.9 F | HEIGHT: 64 IN | HEART RATE: 64 BPM | OXYGEN SATURATION: 98 % | BODY MASS INDEX: 36.54 KG/M2 | WEIGHT: 214 LBS | SYSTOLIC BLOOD PRESSURE: 158 MMHG

## 2017-08-18 DIAGNOSIS — E78.5 HYPERLIPIDEMIA LDL GOAL <160: Primary | ICD-10-CM

## 2017-08-18 DIAGNOSIS — I10 BENIGN ESSENTIAL HYPERTENSION: ICD-10-CM

## 2017-08-18 DIAGNOSIS — R09.81 NASAL CONGESTION: ICD-10-CM

## 2017-08-18 PROCEDURE — 99214 OFFICE O/P EST MOD 30 MIN: CPT | Performed by: PEDIATRICS

## 2017-08-18 RX ORDER — SIMVASTATIN 20 MG
20 TABLET ORAL AT BEDTIME
Qty: 90 TABLET | Refills: 3 | Status: SHIPPED | OUTPATIENT
Start: 2017-08-18 | End: 2018-01-15

## 2017-08-18 RX ORDER — AMLODIPINE BESYLATE 5 MG/1
5 TABLET ORAL DAILY
Qty: 90 TABLET | Refills: 1 | Status: SHIPPED | OUTPATIENT
Start: 2017-08-18 | End: 2018-01-15

## 2017-08-18 NOTE — PATIENT INSTRUCTIONS
Blood pressure:  1. Stop lisinopril  2. Start amlodipine  - make nurse only visit blood pressure check in 2 weeks.    Cholesterol  1. Start simvastatin    Teagan Ann MD  Internal Medicine/Pediatrics  [facility]

## 2017-08-18 NOTE — MR AVS SNAPSHOT
"              After Visit Summary   8/18/2017    Lilia Lilly    MRN: [id number]           Patient Information     Date Of Birth          1955        Visit Information        Provider Department      8/18/2017 10:20 AM Teagan Ann MD [facility]        Today's Diagnoses     Hyperlipidemia LDL goal <160    -  1    Benign essential hypertension          Care Instructions    Blood pressure:  1. Stop lisinopril  2. Start amlodipine  - make nurse only visit blood pressure check in 2 weeks.    Cholesterol  1. Start simvastatin    Teagan Ann MD  Internal Medicine/Pediatrics  [facility]              Follow-ups after your visit        Who to contact     If you have questions or need follow up information about today's clinic visit or your schedule please contact [facility] directly at [phone].  Normal or non-critical lab and imaging results will be communicated to you by MyChart, letter or phone within 4 business days after the clinic has received the results. If you do not hear from us within 7 days, please contact the clinic through MyChart or phone. If you have a critical or abnormal lab result, we will notify you by phone as soon as possible.  Submit refill requests through [employer] or call your pharmacy and they will forward the refill request to us. Please allow 3 business days for your refill to be completed.          Additional Information About Your Visit        Estate Assisthart Information     [employer] lets you send messages to your doctor, view your test results, renew your prescriptions, schedule appointments and more. To sign up, go to [email or web address] . Click on \"Log in\" on the left side of the screen, which will take you to the Welcome page. Then click on \"Sign up Now\" on the right side of the page.     You will be asked to enter the access code listed below, as well as some personal information. Please follow the directions to create your username and " "password.     Your access code is: [email or web address]  Expires: 2017  9:36 AM     Your access code will  in 90 days. If you need help or a new code, please call your [facility] or [phone].        Care EveryWhere ID     This is your Care EveryWhere ID. This could be used by other organizations to access your [facility] medical records  [id number]        Your Vitals Were     Pulse Temperature Height Pulse Oximetry BMI (Body Mass Index)       64 97.9  F (36.6  C) (Oral) 5' 4\" (1.626 m) 98% 36.73 kg/m2        Blood Pressure from Last 3 Encounters:   17 158/84   17 130/80   17 174/61    Weight from Last 3 Encounters:   17 214 lb (97.1 kg)   17 215 lb (97.5 kg)   17 200 lb (90.7 kg)              Today, you had the following     No orders found for display         Today's Medication Changes          These changes are accurate as of: 17 10:51 AM.  If you have any questions, ask your nurse or doctor.               Start taking these medicines.        Dose/Directions    amLODIPine 5 MG tablet   Commonly known as:  NORVASC   Used for:  Benign essential hypertension   Started by:  Teagan Ann MD        Dose:  5 mg   Take 1 tablet (5 mg) by mouth daily   Quantity:  90 tablet   Refills:  1       simvastatin 20 MG tablet   Commonly known as:  ZOCOR   Used for:  Hyperlipidemia LDL goal <160   Started by:  Teagan Ann MD        Dose:  20 mg   Take 1 tablet (20 mg) by mouth At Bedtime   Quantity:  90 tablet   Refills:  3            Where to get your medicines      [id number] [address] AT [facility] [facility] [address]   [place] [address]     Phone:  [phone]     amLODIPine 5 MG tablet    simvastatin 20 MG tablet                Primary Care Provider Office Phone # Fax #    Teagan Ann [phone]       [address] [place] [address] [address] [id number]        Equal " Access to Services     [facility]: Hadii aad ku hadluly Srinivasan, waaxda luqadaha, qaybta kaallove angelabrettyohannes, waxcarson jeanne yeungarydiego mariscal. So [facility] [phone].    ATENCIÓN: Si nathaliala yaima, tiene a wyatt disposición servicios gratuitos de asistencia lingüística. Mosesame al [phone].    We comply with applicable federal civil rights laws and [place] laws. We do not discriminate on the basis of race, color, national origin, age, disability sex, sexual orientation or gender identity.            Thank you!     Thank you for choosing [facility]  for your care. Our goal is always to provide you with excellent care. Hearing back from our patients is one way we can continue to improve our services. Please take a few minutes to complete the written survey that you may receive in the mail after your visit with us. Thank you!             Your Updated Medication List - Protect others around you: Learn how to safely use, store and throw away your medicines at www.disposemymeds.org.          This list is accurate as of: 8/18/17 10:51 AM.  Always use your most recent med list.                   Brand Name Dispense Instructions for use Diagnosis    albuterol 108 (90 BASE) MCG/ACT Inhaler    albuterol    1 Inhaler    Inhale 2 puffs into the lungs every 4 hours as needed for shortness of breath / dyspnea        amLODIPine 5 MG tablet    NORVASC    90 tablet    Take 1 tablet (5 mg) by mouth daily    Benign essential hypertension       azelastine 0.1 % spray    ASTELIN     Spray 1 spray into both nostrils 2 times daily        * CLONIDINE HCL PO           * cloNIDine 0.2 MG tablet    CATAPRES    180 tablet    Take 1 tablet (0.2 mg) by mouth 2 times daily    Generalized hyperhidrosis       * fluticasone 27.5 MCG/SPRAY spray    VERAMYST     Spray 2 sprays into both nostrils daily        * fluticasone 27.5 MCG/SPRAY spray    VERAMYST    10 g    Spray 1-2 sprays into both nostrils daily    Non-allergic  rhinitis       fluticasone 50 MCG/ACT spray    FLONASE    1 Bottle    Spray 1-2 sprays into both nostrils daily    Chronic vasomotor rhinitis       * LISINOPRIL PO           * lisinopril 10 MG tablet    PRINIVIL/ZESTRIL    90 tablet    Take 1 tablet (10 mg) by mouth daily    Benign essential hypertension       * PAXIL PO      Take by mouth daily        * PARoxetine 40 MG tablet    PAXIL    90 tablet    Take 1 tablet (40 mg) by mouth At Bedtime    Major depressive disorder, recurrent episode, moderate (H)       simvastatin 20 MG tablet    ZOCOR    90 tablet    Take 1 tablet (20 mg) by mouth At Bedtime    Hyperlipidemia LDL goal <160       VITAMIN C PO           VITAMIN D (CHOLECALCIFEROL) PO      Take by mouth daily        * Notice:  This list has 8 medication(s) that are the same as other medications prescribed for you. Read the directions carefully, and ask your doctor or other care provider to review them with you.

## 2017-08-18 NOTE — NURSING NOTE
"Chief Complaint   Patient presents with     RECHECK       Initial /84 (BP Location: Right arm, Cuff Size: Adult Large)  Pulse 64  Temp 97.9  F (36.6  C) (Oral)  Ht 5' 4\" (1.626 m)  Wt 214 lb (97.1 kg)  SpO2 98%  BMI 36.73 kg/m2 Estimated body mass index is 36.73 kg/(m^2) as calculated from the following:    Height as of this encounter: 5' 4\" (1.626 m).    Weight as of this encounter: 214 lb (97.1 kg).  Medication Reconciliation: complete   Kenia Arora MA    "

## 2017-08-18 NOTE — PROGRESS NOTES
"  SUBJECTIVE:   Lilia Lilly is a 62 year old female who presents to clinic today for the following health issues:      Lilia is here today to follow up with recent lab work.      Hyperlipidemia - reviewed labs and risk factors with her.  She would like to start medication    Hypertension - no chest pain or shortness of breath. She is wondering if stopping the lisinopril would help with chronic cough.  She feels like she is having a little increased sputum with green flecks.  No fever or facial pressure.    Problem list and histories reviewed & adjusted, as indicated.  Additional history: as documented    Reviewed and updated as needed this visit by clinical staff       Reviewed and updated as needed this visit by Provider         ROS:  Constitutional, HEENT, cardiovascular, pulmonary, gi and gu systems are negative, except as otherwise noted.      OBJECTIVE:   /84 (BP Location: Right arm, Cuff Size: Adult Large)  Pulse 64  Temp 97.9  F (36.6  C) (Oral)  Ht 5' 4\" (1.626 m)  Wt 214 lb (97.1 kg)  SpO2 98%  BMI 36.73 kg/m2  Body mass index is 36.73 kg/(m^2).  GENERAL APPEARANCE: healthy, alert and no distress  EYES: Eyes grossly normal to inspection, PERRL and conjunctivae and sclerae normal  HENT: ear canals and TM's normal and nose and mouth without ulcers or lesions  NECK: no adenopathy, no asymmetry, masses, or scars and thyroid normal to palpation  RESP: lungs clear to auscultation - no rales, rhonchi or wheezes  CV: regular rates and rhythm, normal S1 S2, no S3 or S4 and no murmur, click or rub    Diagnostic Test Results:  none     ASSESSMENT/PLAN:       1. Hyperlipidemia LDL goal <160  New medication - discussed side effects of muscle aches  - simvastatin (ZOCOR) 20 MG tablet; Take 1 tablet (20 mg) by mouth At Bedtime  Dispense: 90 tablet; Refill: 3    2. Benign essential hypertension  Uncontrolled.  Stop lisinopril due to cough and start amlodipine.  FOLLOW UP nurse only santiago 2 weeks - if still " elevated will increase to 10mg.  - amLODIPine (NORVASC) 5 MG tablet; Take 1 tablet (5 mg) by mouth daily  Dispense: 90 tablet; Refill: 1    3. Nasal congestion  No signs of sinus infection today - will see if stopping lisinopril will help with chronic cough/congestion.      Patient Instructions   Blood pressure:  1. Stop lisinopril  2. Start amlodipine  - make nurse only visit blood pressure check in 2 weeks.    Cholesterol  1. Start simvastatin    Teagan Ann MD  Internal Medicine/Pediatrics  [facility]          Teagan Ann MD  [facility]

## 2017-09-01 ENCOUNTER — TRANSFERRED RECORDS (OUTPATIENT)
Dept: HEALTH INFORMATION MANAGEMENT | Facility: CLINIC | Age: 62
End: 2017-09-01

## 2017-09-11 ENCOUNTER — TRANSFERRED RECORDS (OUTPATIENT)
Dept: HEALTH INFORMATION MANAGEMENT | Facility: CLINIC | Age: 62
End: 2017-09-11

## 2017-09-12 ENCOUNTER — ALLIED HEALTH/NURSE VISIT (OUTPATIENT)
Dept: NURSING | Facility: CLINIC | Age: 62
End: 2017-09-12
Payer: COMMERCIAL

## 2017-09-12 VITALS — SYSTOLIC BLOOD PRESSURE: 142 MMHG | DIASTOLIC BLOOD PRESSURE: 70 MMHG | HEART RATE: 76 BPM

## 2017-09-12 DIAGNOSIS — I10 BENIGN ESSENTIAL HYPERTENSION: Primary | ICD-10-CM

## 2017-09-12 PROCEDURE — 99207 ZZC NO CHARGE NURSE ONLY: CPT

## 2017-09-12 NOTE — MR AVS SNAPSHOT
After Visit Summary   9/12/2017    Lilia Lilly    MRN: [id number]           Patient Information     Date Of Birth          1955        Visit Information        Provider Department      9/12/2017 3:00 PM ALICIA NURSE AB [facility]        Today's Diagnoses     Benign essential hypertension    -  1       Follow-ups after your visit        Who to contact     If you have questions or need follow up information about today's clinic visit or your schedule please contact [facility] directly at [phone].  Normal or non-critical lab and imaging results will be communicated to you by MyChart, letter or phone within 4 business days after the clinic has received the results. If you do not hear from us within 7 days, please contact the clinic through MyChart or phone. If you have a critical or abnormal lab result, we will notify you by phone as soon as possible.  Submit refill requests through Chatty or call your pharmacy and they will forward the refill request to us. Please allow 3 business days for your refill to be completed.          Additional Information About Your Visit        Care EveryWhere ID     This is your Care EveryWhere ID. This could be used by other organizations to access your [facility] medical records  [id number]        Your Vitals Were     Pulse                   76            Blood Pressure from Last 3 Encounters:   08/30/18 138/66   08/13/18 164/80   08/06/18 (!) 160/92    Weight from Last 3 Encounters:   08/30/18 214 lb 6.4 oz (97.3 kg)   07/23/18 221 lb (100.2 kg)   06/08/18 228 lb (103.4 kg)              Today, you had the following     No orders found for display       Primary Care Provider Office Phone # Fax #    Teagan Ann [phone] [address] [place] [address]        Equal Access to Services     MO JORGENSEN AH: Eloy Srinivasan, jose rogel, nabor justice  agapito yeungarydiego castroaadidier ah. So [facility] [phone].    ATENCIÓN: Si gemini erwin, tiene a wyatt disposición servicios gratuitos de asistencia lingüística. Jett real [phone].    We comply with applicable federal civil rights laws and [place] laws. We do not discriminate on the basis of race, color, national origin, age, disability, sex, sexual orientation, or gender identity.            Thank you!     Thank you for choosing [facility]  for your care. Our goal is always to provide you with excellent care. Hearing back from our patients is one way we can continue to improve our services. Please take a few minutes to complete the written survey that you may receive in the mail after your visit with us. Thank you!             Your Updated Medication List - Protect others around you: Learn how to safely use, store and throw away your medicines at www.disposemymeds.org.          This list is accurate as of 9/12/17 11:59 PM.  Always use your most recent med list.                   Brand Name Dispense Instructions for use Diagnosis    CLONIDINE HCL PO           fluticasone 27.5 MCG/SPRAY spray    VERAMYST    10 g    Spray 1-2 sprays into both nostrils daily    Non-allergic rhinitis       fluticasone 50 MCG/ACT spray    FLONASE    1 Bottle    Spray 1-2 sprays into both nostrils daily    Chronic vasomotor rhinitis       * LISINOPRIL PO           * lisinopril 10 MG tablet    PRINIVIL/ZESTRIL    90 tablet    Take 1 tablet (10 mg) by mouth daily    Benign essential hypertension       PARoxetine 40 MG tablet    PAXIL    90 tablet    Take 1 tablet (40 mg) by mouth At Bedtime    Major depressive disorder, recurrent episode, moderate (H)       VITAMIN C PO           VITAMIN D (CHOLECALCIFEROL) PO      Take by mouth daily        * Notice:  This list has 2 medication(s) that are the same as other medications prescribed for you. Read the directions carefully, and ask your doctor or other care provider to review them with you.

## 2017-09-12 NOTE — NURSING NOTE
Lilia Lilly is a 62 year old female who comes in today for a Blood Pressure check because of new medication.    *Document pulse and BP  *Use new set of vitals button for multiple readings.  *Use extended vitals for orthostatic    Vitals as recorded, a large cuff was used.    Patient is taking medication as prescribed  Patient is tolerating medications well.  Patient is not monitoring Blood Pressure at home.  Average readings if yes are     Current complaints: none    Disposition: Will forward to provider for review

## 2017-09-22 ENCOUNTER — TELEPHONE (OUTPATIENT)
Dept: PEDIATRICS | Facility: CLINIC | Age: 62
End: 2017-09-22

## 2017-09-22 NOTE — TELEPHONE ENCOUNTER
Will send this to pcp & on-call provider() as FYRICH.     Pt notifies the following:     - wanted to make an appointment to see pcp on Mon, TC transferred the call for triage  - her bilateral ankles are swollen(skin feeling tight) x 2 days, R ankle is worse than L ankle(non pitting)  - was taking lisinopril 10 mg, had to d/c because of cough  - started amlodipine 5 mg qd from 3rd week of Aug, has been tolerated well, also started zocor during the same time frame  - continues clonidine 0.2 mg bid  - denies any other new med or increased salt in diet  - takes amlodipine in the evening, so haven't taken her amlodipine yet  - denies chest pain/tightness, wet cough, muscle aches, LINDER, HA, SOB, wheezing, vision changes, dizziness, palpitations, angioedema, trouble urination, confusion, weakness in one side of face/body, diaphoresis, lethargy, vomiting, nausea, pain radiating to jaw/shoulder/hand, tingling/numbness in toes or dehydration sx's  - made an appointment with  on Monday    Advised to increase her Amlodipine to 10 mg qd from today(pt says that she has enough med on hand for the increased dose), keep feet elevated, can try compression stockings & monitor for worsening cardiac/HTN sx's(went over the sx's to look for). Advised to come in to be seen in UC/ER depending on the worsening of sx's. Provided UC hrs for the evening & weekend. Pt agrees to the plan.     BP Readings from Last 3 Encounters:   09/12/17 142/70   08/18/17 158/84   08/04/17 130/80     Notes from 08/18:  Benign essential hypertension  Uncontrolled.  Stop lisinopril due to cough and start amlodipine.  FOLLOW UP nurse only santiago 2 weeks - if still elevated will increase to 10mg.    Lina RN  Triage Nurse

## 2017-09-25 ENCOUNTER — OFFICE VISIT (OUTPATIENT)
Dept: PEDIATRICS | Facility: CLINIC | Age: 62
End: 2017-09-25
Payer: COMMERCIAL

## 2017-09-25 VITALS
WEIGHT: 220 LBS | SYSTOLIC BLOOD PRESSURE: 136 MMHG | HEIGHT: 64 IN | HEART RATE: 73 BPM | OXYGEN SATURATION: 97 % | DIASTOLIC BLOOD PRESSURE: 70 MMHG | BODY MASS INDEX: 37.56 KG/M2 | TEMPERATURE: 98.3 F

## 2017-09-25 DIAGNOSIS — I10 BENIGN ESSENTIAL HYPERTENSION: ICD-10-CM

## 2017-09-25 DIAGNOSIS — F33.1 MAJOR DEPRESSIVE DISORDER, RECURRENT EPISODE, MODERATE (H): Primary | ICD-10-CM

## 2017-09-25 DIAGNOSIS — E66.01 MORBID OBESITY, UNSPECIFIED OBESITY TYPE (H): ICD-10-CM

## 2017-09-25 DIAGNOSIS — F33.1 MAJOR DEPRESSIVE DISORDER, RECURRENT EPISODE, MODERATE (H): ICD-10-CM

## 2017-09-25 PROCEDURE — 99214 OFFICE O/P EST MOD 30 MIN: CPT | Performed by: PEDIATRICS

## 2017-09-25 RX ORDER — SERTRALINE HYDROCHLORIDE 100 MG/1
100 TABLET, FILM COATED ORAL DAILY
Qty: 30 TABLET | Refills: 1 | Status: SHIPPED | OUTPATIENT
Start: 2017-09-25 | End: 2017-09-25

## 2017-09-25 RX ORDER — AMLODIPINE BESYLATE 10 MG/1
10 TABLET ORAL DAILY
Qty: 90 TABLET | Refills: 1 | Status: SHIPPED | OUTPATIENT
Start: 2017-09-25 | End: 2018-01-15

## 2017-09-25 NOTE — PROGRESS NOTES
"  SUBJECTIVE:   Lilia Lilly is a 62 year old female who presents to clinic today for the following health issues:      Hypertension Follow-up      Outpatient blood pressures are not being checked.    Low Salt Diet: low salt        Amount of exercise or physical activity: None    Problems taking medications regularly: No    Medication side effects: Feels more tired than usual.    Diet: regular (no restrictions)    Patient had two days of ankle swelling at end of last week - denies new food, salty foods.  Better now that she hasn't worked over the weekend. On her feet most of the day.  No shortness of breath.  No swelling now.      PROBLEMS TO ADD ON...  1. Weight - patient interested in pursuing weight loss surgery - she did go to seminar last year, but there were questions about her insurance.  Since that time she has stopped smoking (7/2017) as she knows this was a prerequisite to the surgery.  She has tried nutrisystem and WW in the past without long term results.  She feels worsening craving for sugar - can't control it.    Patient also has been taking paxil for social anxiety and depression - she has been on this since I met her 8/2017.  She has also tried prozac in the past which made her \"hyper\".  She has problems leaving her house sometimes and can get very irritable and anxiety.      Problem list and histories reviewed & adjusted, as indicated.  Additional history    Reviewed and updated as needed this visit by clinical staffTobacco  Allergies  Meds  Med Hx  Surg Hx  Fam Hx  Soc Hx      Reviewed and updated as needed this visit by Provider         ROS:  Constitutional, HEENT, cardiovascular, pulmonary, gi and gu systems are negative, except as otherwise noted.      OBJECTIVE:   /70  Pulse 73  Temp 98.3  F (36.8  C) (Oral)  Ht 5' 4\" (1.626 m)  Wt 220 lb (99.8 kg)  SpO2 97%  BMI 37.76 kg/m2  Body mass index is 37.76 kg/(m^2).  GENERAL APPEARANCE: healthy, alert and no distress  CV: regular " rates and rhythm, normal S1 S2, no S3 or S4 and no murmur, click or rub  EXT: no edema    Diagnostic Test Results:  none     ASSESSMENT/PLAN:         1. Major depressive disorder, recurrent episode, moderate (H)  Advise switching off paxil as this is likely contributing to some part of her weight gain.  Zoloft also indicated for social anxiety - will try direct switch of equivalent dose and follow up in 4-6 weeks.  - sertraline (ZOLOFT) 100 MG tablet; Take 1 tablet (100 mg) by mouth daily  Dispense: 30 tablet; Refill: 1    2. Benign essential hypertension  Controlled at higher dose of amlodpine (was advised by nurse line over w/e to increase) - will continue.  - amLODIPine (NORVASC) 10 MG tablet; Take 1 tablet (10 mg) by mouth daily  Dispense: 90 tablet; Refill: 1    3. Morbid obesity, unspecified obesity type (H)  Failed commercial weight loss programs.  BMI 37 with comorbid conditions.  - BARIATRIC ADULT REFERRAL    Patient Instructions   Blood pressure:  Continue at higher dose of amlodipine 10mg - new prescription sent    Weight: referral below    Anxiety/depression: stop paroxetine and start zoloft - hopefully this will help a little bit with weight loss      Teagan Ann MD  [facility]

## 2017-09-25 NOTE — PATIENT INSTRUCTIONS
Blood pressure:  Continue at higher dose of amlodipine 10mg - new prescription sent    Weight: referral below    Anxiety/depression: stop paroxetine and start zoloft - hopefully this will help a little bit with weight loss

## 2017-09-25 NOTE — NURSING NOTE
"Chief Complaint   Patient presents with     Hypertension     right ankle swelling last week       Initial /72 (BP Location: Right arm, Cuff Size: Adult Large)  Pulse 73  Temp 98.3  F (36.8  C) (Oral)  Ht 5' 4\" (1.626 m)  Wt 220 lb (99.8 kg)  SpO2 97%  BMI 37.76 kg/m2 Estimated body mass index is 37.76 kg/(m^2) as calculated from the following:    Height as of this encounter: 5' 4\" (1.626 m).    Weight as of this encounter: 220 lb (99.8 kg).  Medication Reconciliation: complete   Kenia Arora MA    "

## 2017-09-25 NOTE — TELEPHONE ENCOUNTER
PATIENT IS REQUESTING AUTHORIZATION TO DISPENSE A 90-DAY SUPPLY.    sertraline (ZOLOFT) 100 MG tablet     Last Written Prescription Date: 09/25/2017  Last Fill Quantity: 30, # refills: 1  Last Office Visit with [facility] primary care provider:  9/25/2017          Last PHQ-9 score on record=   PHQ-9 SCORE 8/7/2017   Total Score 13

## 2017-09-25 NOTE — MR AVS SNAPSHOT
After Visit Summary   9/25/2017    Lilia Lilly    MRN: [id number]           Patient Information     Date Of Birth          1955        Visit Information        Provider Department      9/25/2017 8:00 AM Teagan Ann MD [facility] [place]        Today's Diagnoses     Major depressive disorder, recurrent episode, moderate (H)    -  1    Benign essential hypertension        Morbid obesity, unspecified obesity type (H)          Care Instructions    Blood pressure:  Continue at higher dose of amlodipine 10mg - new prescription sent    Weight: referral below    Anxiety/depression: stop paroxetine and start zoloft - hopefully this will help a little bit with weight loss          Follow-ups after your visit        Additional Services     BARIATRIC ADULT REFERRAL       Your provider has referred you to: [facility]: [facility] [phone]   [email or web address]  [facility]: [facility] [phone]   [email or web address]    Please be aware that coverage of these services is subject to the terms and limitations of your health insurance plan.  Call member services at your health plan with any benefit or coverage questions.      Please bring the following with you to your appointment:      (1) List of current medications   (2) This referral request   (3) Any documents/labs given to you for this referral                  Who to contact     If you have questions or need follow up information about today's clinic visit or your schedule please contact [facility] directly at [phone].  Normal or non-critical lab and imaging results will be communicated to you by MyChart, letter or phone within 4 business days after the clinic has received the results. If you do not hear from us within 7 days, please contact the clinic through  "[employer] or phone. If you have a critical or abnormal lab result, we will notify you by phone as soon as possible.  Submit refill requests through Cometa or call your pharmacy and they will forward the refill request to us. Please allow 3 business days for your refill to be completed.          Additional Information About Your Visit        [employer] Information     Cometa lets you send messages to your doctor, view your test results, renew your prescriptions, schedule appointments and more. To sign up, go to [email or web address] . Click on \"Log in\" on the left side of the screen, which will take you to the Welcome page. Then click on \"Sign up Now\" on the right side of the page.     You will be asked to enter the access code listed below, as well as some personal information. Please follow the directions to create your username and password.     Your access code is: [email or web address]  Expires: 2017  9:36 AM     Your access code will  in 90 days. If you need help or a new code, please call your [facility] clinic or [phone].        Care EveryWhere ID     This is your Care EveryWhere ID. This could be used by other organizations to access your [facility] medical records  [id number]        Your Vitals Were     Pulse Temperature Height Pulse Oximetry BMI (Body Mass Index)       73 98.3  F (36.8  C) (Oral) 5' 4\" (1.626 m) 97% 37.76 kg/m2        Blood Pressure from Last 3 Encounters:   17 142/72   17 142/70   17 158/84    Weight from Last 3 Encounters:   17 220 lb (99.8 kg)   17 214 lb (97.1 kg)   17 215 lb (97.5 kg)              We Performed the Following     BARIATRIC ADULT REFERRAL          Today's Medication Changes          These changes are accurate as of: 17  8:37 AM.  If you have any questions, ask your nurse or doctor.               Start taking these medicines.        Dose/Directions    sertraline 100 MG tablet   Commonly known as:  ZOLOFT   Used for:  Major " depressive disorder, recurrent episode, moderate (H)   Started by:  Teagan Ann MD        Dose:  100 mg   Take 1 tablet (100 mg) by mouth daily   Quantity:  30 tablet   Refills:  1         These medicines have changed or have updated prescriptions.        Dose/Directions    * amLODIPine 5 MG tablet   Commonly known as:  NORVASC   This may have changed:  Another medication with the same name was added. Make sure you understand how and when to take each.   Used for:  Benign essential hypertension   Changed by:  Teagan Ann MD        Dose:  5 mg   Take 1 tablet (5 mg) by mouth daily   Quantity:  90 tablet   Refills:  1       * amLODIPine 10 MG tablet   Commonly known as:  NORVASC   This may have changed:  You were already taking a medication with the same name, and this prescription was added. Make sure you understand how and when to take each.   Used for:  Benign essential hypertension   Changed by:  Teagan Ann MD        Dose:  10 mg   Take 1 tablet (10 mg) by mouth daily   Quantity:  90 tablet   Refills:  1       * Notice:  This list has 2 medication(s) that are the same as other medications prescribed for you. Read the directions carefully, and ask your doctor or other care provider to review them with you.         Where to get your medicines      These medications were sent to [id number]  [place] - [address] AT [facility]  [address] [place] [address] [address]     Phone:  [phone]     amLODIPine 10 MG tablet    sertraline 100 MG tablet                Primary Care Provider Office Phone # Fax #    Teagan Ann [phone]       [address] [place] [address]        Equal Access to Services     [facility] AH: Hadii chip armendariz Sojazmyne, waaxda luqadaha, qaybta kaalmayohannes vincent, nabor mariscal. So [facility] [phone].    ATENCIÓN: Si habla español, tiene a wyatt disposición servicios  tommie de asistencia lingüística. Jett real [phone].    We comply with applicable federal civil rights laws and [place] laws. We do not discriminate on the basis of race, color, national origin, age, disability sex, sexual orientation or gender identity.            Thank you!     Thank you for choosing [facility]  for your care. Our goal is always to provide you with excellent care. Hearing back from our patients is one way we can continue to improve our services. Please take a few minutes to complete the written survey that you may receive in the mail after your visit with us. Thank you!             Your Updated Medication List - Protect others around you: Learn how to safely use, store and throw away your medicines at www.disposemymeds.org.          This list is accurate as of: 9/25/17  8:37 AM.  Always use your most recent med list.                   Brand Name Dispense Instructions for use Diagnosis    albuterol 108 (90 BASE) MCG/ACT Inhaler    PROAIR HFA    1 Inhaler    Inhale 2 puffs into the lungs every 4 hours as needed for shortness of breath / dyspnea        * amLODIPine 5 MG tablet    NORVASC    90 tablet    Take 1 tablet (5 mg) by mouth daily    Benign essential hypertension       * amLODIPine 10 MG tablet    NORVASC    90 tablet    Take 1 tablet (10 mg) by mouth daily    Benign essential hypertension       azelastine 0.1 % spray    ASTELIN     Spray 1 spray into both nostrils 2 times daily        * CLONIDINE HCL PO           * cloNIDine 0.2 MG tablet    CATAPRES    180 tablet    Take 1 tablet (0.2 mg) by mouth 2 times daily    Generalized hyperhidrosis       * fluticasone 27.5 MCG/SPRAY spray    VERAMYST     Spray 2 sprays into both nostrils daily        * fluticasone 27.5 MCG/SPRAY spray    VERAMYST    10 g    Spray 1-2 sprays into both nostrils daily    Non-allergic rhinitis       fluticasone 50 MCG/ACT spray    FLONASE    1 Bottle    Spray 1-2 sprays into both nostrils daily     Chronic vasomotor rhinitis       LISINOPRIL PO           * PAXIL PO      Take by mouth daily        * PARoxetine 40 MG tablet    PAXIL    90 tablet    Take 1 tablet (40 mg) by mouth At Bedtime    Major depressive disorder, recurrent episode, moderate (H)       sertraline 100 MG tablet    ZOLOFT    30 tablet    Take 1 tablet (100 mg) by mouth daily    Major depressive disorder, recurrent episode, moderate (H)       simvastatin 20 MG tablet    ZOCOR    90 tablet    Take 1 tablet (20 mg) by mouth At Bedtime    Hyperlipidemia LDL goal <160       VITAMIN C PO           VITAMIN D (CHOLECALCIFEROL) PO      Take by mouth daily        * Notice:  This list has 8 medication(s) that are the same as other medications prescribed for you. Read the directions carefully, and ask your doctor or other care provider to review them with you.

## 2017-09-26 NOTE — TELEPHONE ENCOUNTER
Routing refill request to provider for review/approval because:  Ok to change to 90?  Angela Fay, RN  Triage Nurse

## 2017-09-27 RX ORDER — SERTRALINE HYDROCHLORIDE 100 MG/1
100 TABLET, FILM COATED ORAL DAILY
Qty: 90 TABLET | Refills: 1 | Status: SHIPPED | OUTPATIENT
Start: 2017-09-27 | End: 2018-01-15

## 2017-11-06 ENCOUNTER — TRANSFERRED RECORDS (OUTPATIENT)
Dept: HEALTH INFORMATION MANAGEMENT | Facility: CLINIC | Age: 62
End: 2017-11-06

## 2017-11-10 ENCOUNTER — OFFICE VISIT (OUTPATIENT)
Dept: PEDIATRICS | Facility: CLINIC | Age: 62
End: 2017-11-10
Payer: COMMERCIAL

## 2017-11-10 VITALS
HEART RATE: 62 BPM | WEIGHT: 221 LBS | DIASTOLIC BLOOD PRESSURE: 78 MMHG | OXYGEN SATURATION: 96 % | SYSTOLIC BLOOD PRESSURE: 136 MMHG | TEMPERATURE: 97.9 F | BODY MASS INDEX: 37.73 KG/M2 | HEIGHT: 64 IN

## 2017-11-10 DIAGNOSIS — F33.1 MAJOR DEPRESSIVE DISORDER, RECURRENT EPISODE, MODERATE (H): ICD-10-CM

## 2017-11-10 DIAGNOSIS — I10 BENIGN ESSENTIAL HYPERTENSION: Primary | ICD-10-CM

## 2017-11-10 PROCEDURE — 99214 OFFICE O/P EST MOD 30 MIN: CPT | Performed by: PEDIATRICS

## 2017-11-10 RX ORDER — LISINOPRIL 10 MG/1
10 TABLET ORAL DAILY
Qty: 90 TABLET | Refills: 3 | Status: SHIPPED | OUTPATIENT
Start: 2017-11-10 | End: 2018-08-06

## 2017-11-10 RX ORDER — CITALOPRAM HYDROBROMIDE 40 MG/1
40 TABLET ORAL DAILY
Qty: 30 TABLET | Refills: 1 | Status: SHIPPED | OUTPATIENT
Start: 2017-11-10 | End: 2018-01-15

## 2017-11-10 NOTE — NURSING NOTE
"Chief Complaint   Patient presents with     Hypertension       Initial /80 (BP Location: Right arm, Cuff Size: Adult Large)  Pulse 62  Temp 97.9  F (36.6  C) (Oral)  Ht 5' 4\" (1.626 m)  Wt 221 lb (100.2 kg)  SpO2 96%  BMI 37.93 kg/m2 Estimated body mass index is 37.93 kg/(m^2) as calculated from the following:    Height as of this encounter: 5' 4\" (1.626 m).    Weight as of this encounter: 221 lb (100.2 kg).  Medication Reconciliation: complete   Kenia Arora MA    "

## 2017-11-10 NOTE — PROGRESS NOTES
"  SUBJECTIVE:   Lilia Lilly is a 62 year old female who presents to clinic today for the following health issues:      Hypertension Follow-up      Outpatient blood pressures are not being checked.    Low Salt Diet: low salt        Amount of exercise or physical activity: Walks a lot at work    Problems taking medications regularly: No    Medication side effects: Will discuss today, Itching, with amlodipine.     Diet: regular (no restrictions)    Patient started having itching and edema about a week after increasing the amlodipine dose to 10mg.  She stopped this and went back on the lisinopril 10mg.  No problems with cough yet.Tolerating this much better.       PROBLEMS TO ADD ON...  Depression and Anxiety Follow-Up    Status since last visit: worsened - patient started taking both paxil and zoloft    Other associated symptoms:None    Complicating factors:     Significant life event: No     Current substance abuse: no    Patient states the zoloft makes her feel like a zombie and the paxil doesn't control her symptoms as well as she would like. She thinks she has also been on prozac in the past and it was not helpful.    PHQ-9 Score and MyChart F/U Questions 8/7/2017   Total Score 13   Q9: Suicide Ideation Not at all     No flowsheet data found.    PHQ-9  English  PHQ-9   Any Language  GAD7  Suicide Assessment Five-step Evaluation and Treatment (SAFE-T)        Problem list and histories reviewed & adjusted, as indicated.  Additional history: as documented    Reviewed and updated as needed this visit by clinical staff     Reviewed and updated as needed this visit by Provider         ROS:  Constitutional, HEENT, cardiovascular, pulmonary, gi and gu systems are negative, except as otherwise noted.      OBJECTIVE:   /78  Pulse 62  Temp 97.9  F (36.6  C) (Oral)  Ht 5' 4\" (1.626 m)  Wt 221 lb (100.2 kg)  SpO2 96%  BMI 37.93 kg/m2  Body mass index is 37.93 kg/(m^2).  NA    Diagnostic Test Results:  none "     ASSESSMENT/PLAN:       1. Benign essential hypertension  Controlled, ok to continue with lisinopril  - lisinopril (PRINIVIL/ZESTRIL) 10 MG tablet; Take 1 tablet (10 mg) by mouth daily  Dispense: 90 tablet; Refill: 3    2. Major depressive disorder, recurrent episode, moderate (H)  Uncontrolled.  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  See PI  - citalopram (CELEXA) 40 MG tablet; Take 1 tablet (40 mg) by mouth daily  Dispense: 30 tablet; Refill: 1    Patient Instructions   Continue lisinopril at 10mg.    Stop the zoloft and paxil.    Start Celexa - follow up in 5-6 weeks (week of Dec 18th).  However if not tolerating please call and we will see you sooner.      Teagan Ann MD  [facility]

## 2017-11-10 NOTE — MR AVS SNAPSHOT
"              After Visit Summary   11/10/2017    Lilia Lilly    MRN: [id number]           Patient Information     Date Of Birth          1955        Visit Information        Provider Department      11/10/2017 3:00 PM [facility]        Today's Diagnoses     Benign essential hypertension    -  1    Major depressive disorder, recurrent episode, moderate (H)          Care Instructions    Continue lisinopril at 10mg.    Stop the zoloft and paxil.    Start Celexa - follow up in 5-6 weeks (week of Dec 18th).  However if not tolerating please call and we will see you sooner.          Follow-ups after your visit        Who to contact     If you have questions or need follow up information about today's clinic visit or your schedule please [facility] at [phone].  Normal or non-critical lab and imaging results will be communicated to you by [employer], letter or phone within 4 business days after the clinic has received the results. If you do not hear from us within 7 days, please contact the clinic through Uptake[employer] or phone. If you have a critical or abnormal lab result, we will notify you by phone as soon as possible.  Submit refill requests through [employer] or call your pharmacy and they will forward the refill request to us. Please allow 3 business days for your refill to be completed.          Additional Information About Your Visit        MyChart Information     [employer] lets you send messages to your doctor, view your test results, renew your prescriptions, schedule appointments and more. To sign up, go to [email or web address] . Click on \"Log in\" on the left side of the screen, which will take you to the Welcome page. Then click on \"Sign up Now\" on the right side of the page.     You will be asked to enter the access code listed below, as well as some personal information. Please follow the directions to create your username and password.   " "  Your access code is: [email or web address]  Expires: 2018  3:34 PM     Your access code will  in 90 days. If you need help or a new code, please call your [place] clinic or [phone].        Care EveryWhere ID     This is your Care EveryWhere ID. This could be used by other organizations to access your [place] medical records  [id number]        Your Vitals Were     Pulse Temperature Height Pulse Oximetry BMI (Body Mass Index)       62 97.9  F (36.6  C) (Oral) 5' 4\" (1.626 m) 96% 37.93 kg/m2        Blood Pressure from Last 3 Encounters:   11/10/17 142/80   17 136/70   17 142/70    Weight from Last 3 Encounters:   11/10/17 221 lb (100.2 kg)   17 220 lb (99.8 kg)   17 214 lb (97.1 kg)              Today, you had the following     No orders found for display         Today's Medication Changes          These changes are accurate as of: 11/10/17  3:34 PM.  If you have any questions, ask your nurse or doctor.               Start taking these medicines.        Dose/Directions    citalopram 40 MG tablet   Commonly known as:  celeXA   Used for:  Major depressive disorder, recurrent episode, moderate (H)   Started by:  Teagan Ann MD        Dose:  40 mg   Take 1 tablet (40 mg) by mouth daily   Quantity:  30 tablet   Refills:  1         These medicines have changed or have updated prescriptions.        Dose/Directions    lisinopril 10 MG tablet   Commonly known as:  PRINIVIL/ZESTRIL   This may have changed:    - medication strength  - how much to take  - when to take this   Used for:  Benign essential hypertension   Changed by:  Teagan Ann MD        Dose:  10 mg   Take 1 tablet (10 mg) by mouth daily   Quantity:  90 tablet   Refills:  3            Where to get your medicines      These medications were sent to [facility] [address] [facility]   [address], [place] [address]     Phone:  [phone]     citalopram 40 MG " tablet    lisinopril 10 MG tablet                Primary Care Provider Office Phone # Fax #    Teagan Ann [phone] [address] [place] [address]        Equal Access to Services     [facility] JAMEEL : Hadii chip ku gabeo Sobrianali, waaxda luqadaha, qaybta kaalmada adeegyada, nabor armijon agapito peterson laFerglenda loida. So [facility] [phone].    ATENCIÓN: Si habla español, tiene a wyatt disposición servicios gratuitos de asistencia lingüística. Llame al [phone].    We comply with applicable federal civil rights laws and [place] laws. We do not discriminate on the basis of race, color, national origin, age, disability, sex, sexual orientation, or gender identity.            Thank you!     Thank you for choosing [facility]  for your care. Our goal is always to provide you with excellent care. Hearing back from our patients is one way we can continue to improve our services. Please take a few minutes to complete the written survey that you may receive in the mail after your visit with us. Thank you!             Your Updated Medication List - Protect others around you: Learn how to safely use, store and throw away your medicines at www.disposemymeds.org.          This list is accurate as of: 11/10/17  3:34 PM.  Always use your most recent med list.                   Brand Name Dispense Instructions for use Diagnosis    albuterol 108 (90 BASE) MCG/ACT Inhaler    PROAIR HFA    1 Inhaler    Inhale 2 puffs into the lungs every 4 hours as needed for shortness of breath / dyspnea        * amLODIPine 5 MG tablet    NORVASC    90 tablet    Take 1 tablet (5 mg) by mouth daily    Benign essential hypertension       * amLODIPine 10 MG tablet    NORVASC    90 tablet    Take 1 tablet (10 mg) by mouth daily    Benign essential hypertension       azelastine 0.1 % spray    ASTELIN     Spray 1 spray into both nostrils 2 times daily        citalopram 40 MG tablet    celeXA    30  tablet    Take 1 tablet (40 mg) by mouth daily    Major depressive disorder, recurrent episode, moderate (H)       * CLONIDINE HCL PO           * cloNIDine 0.2 MG tablet    CATAPRES    180 tablet    Take 1 tablet (0.2 mg) by mouth 2 times daily    Generalized hyperhidrosis       fluticasone 27.5 MCG/SPRAY spray    VERAMYST    10 g    Spray 1-2 sprays into both nostrils daily    Non-allergic rhinitis       fluticasone 50 MCG/ACT spray    FLONASE    1 Bottle    Spray 1-2 sprays into both nostrils daily    Chronic vasomotor rhinitis       lisinopril 10 MG tablet    PRINIVIL/ZESTRIL    90 tablet    Take 1 tablet (10 mg) by mouth daily    Benign essential hypertension       PAXIL PO      Take 40 mg by mouth daily        sertraline 100 MG tablet    ZOLOFT    90 tablet    Take 1 tablet (100 mg) by mouth daily    Major depressive disorder, recurrent episode, moderate (H)       simvastatin 20 MG tablet    ZOCOR    90 tablet    Take 1 tablet (20 mg) by mouth At Bedtime    Hyperlipidemia LDL goal <160       VITAMIN C PO           VITAMIN D (CHOLECALCIFEROL) PO      Take by mouth daily        * Notice:  This list has 4 medication(s) that are the same as other medications prescribed for you. Read the directions carefully, and ask your doctor or other care provider to review them with you.

## 2017-11-10 NOTE — PATIENT INSTRUCTIONS
Continue lisinopril at 10mg.    Stop the zoloft and paxil.    Start Celexa - follow up in 5-6 weeks (week of Dec 18th).  However if not tolerating please call and we will see you sooner.

## 2018-01-08 ENCOUNTER — TRANSFERRED RECORDS (OUTPATIENT)
Dept: HEALTH INFORMATION MANAGEMENT | Facility: CLINIC | Age: 63
End: 2018-01-08

## 2018-01-15 ENCOUNTER — OFFICE VISIT (OUTPATIENT)
Dept: PEDIATRICS | Facility: CLINIC | Age: 63
End: 2018-01-15
Payer: COMMERCIAL

## 2018-01-15 VITALS
DIASTOLIC BLOOD PRESSURE: 72 MMHG | WEIGHT: 227 LBS | BODY MASS INDEX: 38.76 KG/M2 | OXYGEN SATURATION: 97 % | TEMPERATURE: 97.6 F | HEIGHT: 64 IN | SYSTOLIC BLOOD PRESSURE: 142 MMHG | HEART RATE: 70 BPM

## 2018-01-15 DIAGNOSIS — M65.30 TRIGGER FINGER, ACQUIRED: ICD-10-CM

## 2018-01-15 DIAGNOSIS — Z01.818 PREOP GENERAL PHYSICAL EXAM: Primary | ICD-10-CM

## 2018-01-15 DIAGNOSIS — I10 BENIGN ESSENTIAL HYPERTENSION: ICD-10-CM

## 2018-01-15 DIAGNOSIS — F33.1 MAJOR DEPRESSIVE DISORDER, RECURRENT EPISODE, MODERATE (H): ICD-10-CM

## 2018-01-15 PROCEDURE — 99215 OFFICE O/P EST HI 40 MIN: CPT | Performed by: INTERNAL MEDICINE

## 2018-01-15 PROCEDURE — 80048 BASIC METABOLIC PNL TOTAL CA: CPT | Performed by: INTERNAL MEDICINE

## 2018-01-15 PROCEDURE — 93000 ELECTROCARDIOGRAM COMPLETE: CPT | Performed by: INTERNAL MEDICINE

## 2018-01-15 PROCEDURE — 36415 COLL VENOUS BLD VENIPUNCTURE: CPT | Performed by: INTERNAL MEDICINE

## 2018-01-15 NOTE — PATIENT INSTRUCTIONS
Before Your Surgery      Call your surgeon if there is any change in your health. This includes signs of a cold or flu (such as a sore throat, runny nose, cough, rash or fever).    Do not smoke, drink alcohol or take over the counter medicine (unless your surgeon or primary care doctor tells you to) for the 24 hours before and after surgery.    If you take prescribed drugs:   o Do not take lisinopril the night before surgery    Eating and drinking prior to surgery: follow the instructions from your surgeon    Take a shower or bath the night before surgery. Use the soap your surgeon gave you to gently clean your skin. If you do not have soap from your surgeon, use your regular soap. Do not shave or scrub the surgery site.  Wear clean pajamas and have clean sheets on your bed.     No aspirin, ibuprofen or naproxen for 7 days prior to surgery    Labs today: electrolytes and kidney function

## 2018-01-15 NOTE — MR AVS SNAPSHOT
After Visit Summary   1/15/2018    Lilia Lilly    MRN: [id number]           Patient Information     Date Of Birth          1955        Visit Information        Provider Department      1/15/2018 4:00 PM Carli Ramirez MD [facility] [place]        Today's Diagnoses     Preop general physical exam    -  1    Trigger finger, acquired          Care Instructions      Before Your Surgery      Call your surgeon if there is any change in your health. This includes signs of a cold or flu (such as a sore throat, runny nose, cough, rash or fever).    Do not smoke, drink alcohol or take over the counter medicine (unless your surgeon or primary care doctor tells you to) for the 24 hours before and after surgery.    If you take prescribed drugs:   o Do not take lisinopril the night before surgery    Eating and drinking prior to surgery: follow the instructions from your surgeon    Take a shower or bath the night before surgery. Use the soap your surgeon gave you to gently clean your skin. If you do not have soap from your surgeon, use your regular soap. Do not shave or scrub the surgery site.  Wear clean pajamas and have clean sheets on your bed.     No aspirin, ibuprofen or naproxen for 7 days prior to surgery    Labs today: electrolytes and kidney function          Follow-ups after your visit        Who to contact     If you have questions or need follow up information about today's clinic visit or your schedule please contact [facility] [place] directly at [phone].  Normal or non-critical lab and imaging results will be communicated to you by MyChart, letter or phone within 4 business days after the clinic has received the results. If you do not hear from us within 7 days, please contact the clinic through MyChart or phone. If you have a critical or abnormal lab result, we will notify you by phone as soon as possible.  Submit refill requests through [employer] or call your pharmacy and  "they will forward the refill request to us. Please allow 3 business days for your refill to be completed.          Additional Information About Your Visit        [employer] Information     "[employer] lets you send messages to your doctor, view your test results, renew your prescriptions, schedule appointments and more. To sign up, go to [email or web address]/"[employer] . Click on \"Log in\" on the left side of the screen, which will take you to the Welcome page. Then click on \"Sign up Now\" on the right side of the page.     You will be asked to enter the access code listed below, as well as some personal information. Please follow the directions to create your username and password.     Your access code is: [email or web address]  Expires: 2018  3:34 PM     Your access code will  in 90 days. If you need help or a new code, please call your [place] clinic or [phone].        Care EveryWhere ID     This is your [facility] EveryWhere ID. This could be used by other organizations to access your [place] medical records  [id number]        Your Vitals Were     Pulse Temperature Height Pulse Oximetry BMI (Body Mass Index)       70 97.6  F (36.4  C) (Tympanic) 5' 4\" (1.626 m) 97% 38.96 kg/m2        Blood Pressure from Last 3 Encounters:   01/15/18 142/72   11/10/17 136/78   17 136/70    Weight from Last 3 Encounters:   01/15/18 227 lb (103 kg)   11/10/17 221 lb (100.2 kg)   17 220 lb (99.8 kg)              We Performed the Following     Basic metabolic panel     EKG 12-lead complete w/read - Clinics          Today's Medication Changes          These changes are accurate as of: 1/15/18  4:40 PM.  If you have any questions, ask your nurse or doctor.               These medicines have changed or have updated prescriptions.        Dose/Directions    cloNIDine 0.2 MG tablet   Commonly known as:  CATAPRES   This may have changed:  Another medication with the same name was removed. Continue taking this medication, and follow the " directions you see here.   Used for:  Generalized hyperhidrosis   Changed by:  Teagan Ann MD        Dose:  0.2 mg   Take 1 tablet (0.2 mg) by mouth 2 times daily   Quantity:  180 tablet   Refills:  3                Primary Care Provider Office Phone # Fax #    Teagan Ann [phone] [address] [place] [address]        Equal Access to Services     [facility]: Hadii aad ku hadasho Soomaali, waaxda luqadaha, qaybta kaalmada adeegyada, waxay idiin hayaan adeeg kharysh la'aan . So [facility] [place] [phone].    ATENCIÓN: Si nathaliala espsamra, tiene a wyatt disposición servicios gratuitos de asistencia lingüística. Ll[facility] [phone].    We comply with applicable federal civil rights laws and [place] laws. We do not discriminate on the basis of race, color, national origin, age, disability, sex, sexual orientation, or gender identity.            Thank you!     Thank you for choosing [facility]  for your care. Our goal is always to provide you with excellent care. Hearing back from our patients is one way we can continue to improve our services. Please take a few minutes to complete the written survey that you may receive in the mail after your visit with us. Thank you!             Your Updated Medication List - Protect others around you: Learn how to safely use, store and throw away your medicines at www.disposemymeds.org.          This list is accurate as of: 1/15/18  4:40 PM.  Always use your most recent med list.                   Brand Name Dispense Instructions for use Diagnosis    cloNIDine 0.2 MG tablet    CATAPRES    180 tablet    Take 1 tablet (0.2 mg) by mouth 2 times daily    Generalized hyperhidrosis       fluticasone 27.5 MCG/SPRAY spray    VERAMYST    10 g    Spray 1-2 sprays into both nostrils daily    Non-allergic rhinitis       fluticasone 50 MCG/ACT spray    FLONASE    1 Bottle    Spray 1-2 sprays into both nostrils daily    Chronic vasomotor  rhinitis       lisinopril 10 MG tablet    PRINIVIL/ZESTRIL    90 tablet    Take 1 tablet (10 mg) by mouth daily    Benign essential hypertension       PAXIL PO      Take 40 mg by mouth daily        VITAMIN C PO           VITAMIN D (CHOLECALCIFEROL) PO      Take by mouth daily

## 2018-01-15 NOTE — LETTER
[facility]  [address]  [place] [address]                  [phone]   January 17, 2018    Lilia Lilly  [address] [place] [address]      Dear Lilia,    Here is a summary of your recent test results:    Your electrolytes and kidney function are normal.    Please let me know if you have any questions. Good luck with your surgery!    Your test results are enclosed.      Please contact me if you have any questions.           Thank you very much for choosing [facility]    Best regards,    Carli Ramirez MD        Results for orders placed or performed in visit on 01/15/18   Basic metabolic panel   Result Value Ref Range    Sodium 138 133 - 144 mmol/L    Potassium 4.0 3.4 - 5.3 mmol/L    Chloride 105 94 - 109 mmol/L    Carbon Dioxide 24 20 - 32 mmol/L    Anion Gap 9 3 - 14 mmol/L    Glucose 86 70 - 99 mg/dL    Urea Nitrogen 15 7 - 30 mg/dL    Creatinine 0.76 0.52 - 1.04 mg/dL    GFR Estimate 77 >60 mL/min/1.7m2    GFR Estimate If Black >90 >60 mL/min/1.7m2    Calcium 8.9 8.5 - 10.1 mg/dL

## 2018-01-15 NOTE — NURSING NOTE
"Chief Complaint   Patient presents with     Pre-Op Exam       Initial /72  Pulse 70  Temp 97.6  F (36.4  C) (Tympanic)  Ht 5' 4\" (1.626 m)  Wt 227 lb (103 kg)  SpO2 97%  BMI 38.96 kg/m2 Estimated body mass index is 38.96 kg/(m^2) as calculated from the following:    Height as of this encounter: 5' 4\" (1.626 m).    Weight as of this encounter: 227 lb (103 kg).  Medication Reconciliation: complete   Nae GUERIN      "

## 2018-01-15 NOTE — PROGRESS NOTES
[facility]  [address]  [address]  [place] [address]  [phone]  Dept: [phone]    PRE-OP EVALUATION:  Today's date: 1/15/2018    Lilia Lilly (: 1955) presents for pre-operative evaluation assessment as requested by Dr. Andrei Colon.  She requires evaluation and anesthesia risk assessment prior to undergoing surgery/procedure for treatment of trigger finger and nodule right hand .  Proposed procedure: removal of nodule and trigger finger release    Date of Surgery/ Procedure: 18  Time of Surgery/ Procedure: 0900  Hospital/Surgical Facility: [facility]  Fax number for surgical facility: [phone]  Primary Physician: Teagan Ann  Type of Anesthesia Anticipated: General    Patient has a Health Care Directive or Living Will:  NO    Preop Questions 1/15/2018   1.  Do you have a history of heart attack, stroke, stent, bypass or surgery on an artery in the head, neck, heart or legs? No   2.  Do you ever have any pain or discomfort in your chest? No   3.  Do you have a history of  Heart Failure? No   4.   Are you troubled by shortness of breath when:  walking on a level surface, or up a slight hill, or at night? No   5.  Do you currently have a cold, bronchitis or other respiratory infection? No   6.  Do you have a cough, shortness of breath, or wheezing? No   7.  Do you sometimes get pains in the calves of your legs when you walk? No   8. Do you or anyone in your family have previous history of blood clots? No   9.  Do you or does anyone in your family have a serious bleeding problem such as prolonged bleeding following surgeries or cuts? No   10. Have you ever had problems with anemia or been told to take iron pills? No   11. Have you had any abnormal blood loss such as black, tarry or bloody stools, or abnormal vaginal bleeding? No   12. Have you ever had a blood transfusion? No   13. Have you or any of your relatives ever had problems  with anesthesia? No   14. Do you have sleep apnea, excessive snoring or daytime drowsiness? YES - sleep apnea   15. Do you have any prosthetic heart valves? No   16. Do you have prosthetic joints? No   17. Is there any chance that you may be pregnant? No       HPI:                                                      Brief HPI related to upcoming procedure: patient with long standing history of symptoms. Plans trigger finger release and removal of cyst.    HYPERTENSION - Patient has longstanding history of mod-severe HTN , currently denies any symptoms referable to elevated blood pressure. Specifically denies chest pain, palpitations, dyspnea, orthopnea, PND or peripheral edema. Blood pressure readings have been in normal range. Current medication regimen is as listed below. Patient has mild cough with the lisinopril.                                                      MEDICAL HISTORY:                                                    Patient Active Problem List    Diagnosis Date Noted     Benign essential hypertension 08/04/2017     Priority: Medium     Prediabetes 08/04/2017     Priority: Medium     Ulcerative pancolitis with complication (H) 08/04/2017     Priority: Medium     Generalized hyperhidrosis 08/04/2017     Priority: Medium     Morbid obesity, unspecified obesity type (H) 08/04/2017     Priority: Medium     Obstructive sleep apnea 08/02/2017     Priority: Medium     Left-sided tinnitus 04/13/2017     Priority: Medium     Sensorineural hearing loss, asymmetrical 04/13/2017     Priority: Medium     Non-allergic rhinitis 10/17/2016     Priority: Medium     Overview:   Skin Prick Testing was performed on: 10/17/2016   No sensitivities were detected to the antigens tested         Malignant neoplasm of skin 04/20/2016     Priority: Medium     Overview:   4/13/16 Right upper shin, SCC/KA s/p ED&C 5/18/16 Rodriguez       S/P laparoscopic cholecystectomy 02/25/2014     Priority: Medium     Sinusitis 01/30/2013      "Priority: Medium     Symptomatic states associated with artificial menopause 03/22/2010     Priority: Medium     Major depressive disorder, recurrent episode, moderate (H) 06/11/2009     Priority: Medium     Osteoporosis 06/11/2009     Priority: Medium     Psoriasis 06/11/2009     Priority: Medium      Past Medical History:   Diagnosis Date     Hypertension      History reviewed. No pertinent surgical history.  Current Outpatient Prescriptions   Medication Sig Dispense Refill     lisinopril (PRINIVIL/ZESTRIL) 10 MG tablet Take 1 tablet (10 mg) by mouth daily 90 tablet 3     fluticasone (FLONASE) 50 MCG/ACT spray Spray 1-2 sprays into both nostrils daily 1 Bottle 11     fluticasone (VERAMYST) 27.5 MCG/SPRAY spray Spray 1-2 sprays into both nostrils daily 10 g 11     cloNIDine (CATAPRES) 0.2 MG tablet Take 1 tablet (0.2 mg) by mouth 2 times daily 180 tablet 3     PARoxetine HCl (PAXIL PO) Take 40 mg by mouth daily        Ascorbic Acid (VITAMIN C PO)        VITAMIN D, CHOLECALCIFEROL, PO Take by mouth daily       [DISCONTINUED] CLONIDINE HCL PO        OTC products: NSAIDS    Allergies   Allergen Reactions     Amlodipine      Itching and edema     Lisinopril Cough     Losartan Other (See Comments)     Back pain     Pseudoephedrine Unknown      Latex Allergy: NO    Social History   Substance Use Topics     Smoking status: Former Smoker     Types: Cigarettes     Quit date: 7/21/2017     Smokeless tobacco: Never Used     Alcohol use Yes      Comment: social drinker     History   Drug Use No     REVIEW OF SYSTEMS:                                                    Post-nasal drip from allergies, Constitutional, neuro, ENT, endocrine, pulmonary, cardiac, gastrointestinal, genitourinary, musculoskeletal, integument and psychiatric systems are negative, except as otherwise noted.    EXAM:                                                    /72  Pulse 70  Temp 97.6  F (36.4  C) (Tympanic)  Ht 5' 4\" (1.626 m)  Wt 227 lb " (103 kg)  SpO2 97%  BMI 38.96 kg/m2    GENERAL APPEARANCE: healthy, alert and no distress     EYES: EOMI, PERRL     HENT: ear canals and TM's normal and nose and mouth without ulcers or lesions     NECK: no adenopathy, no asymmetry, masses, or scars and thyroid normal to palpation     RESP: lungs clear to auscultation - no rales, rhonchi or wheezes     CV: regular rates and rhythm, normal S1 S2, no S3 or S4 and no murmur, click or rub     ABDOMEN:  soft, nontender, no HSM or masses and bowel sounds normal     MS: extremities normal- no gross deformities noted, no evidence of inflammation in joints, FROM in all extremities.     SKIN: no suspicious lesions or rashes     NEURO: Normal strength and tone, sensory exam grossly normal, mentation intact and speech normal     PSYCH: mentation appears normal. and affect normal/bright     LYMPHATICS: No axillary, cervical, or supraclavicular nodes    DIAGNOSTICS:                                                    EKG: appears normal, NSR, normal axis, normal intervals, no acute ST/T changes c/w ischemia, no LVH by voltage criteria  Results for orders placed or performed in visit on 01/15/18   Basic metabolic panel   Result Value Ref Range    Sodium 138 133 - 144 mmol/L    Potassium 4.0 3.4 - 5.3 mmol/L    Chloride 105 94 - 109 mmol/L    Carbon Dioxide 24 20 - 32 mmol/L    Anion Gap 9 3 - 14 mmol/L    Glucose 86 70 - 99 mg/dL    Urea Nitrogen 15 7 - 30 mg/dL    Creatinine 0.76 0.52 - 1.04 mg/dL    GFR Estimate 77 >60 mL/min/1.7m2    GFR Estimate If Black >90 >60 mL/min/1.7m2    Calcium 8.9 8.5 - 10.1 mg/dL       Recent Labs   Lab Test  08/04/17   0957  03/19/17 2000   HGB   --   14.7   PLT   --   196   NA  139  141   POTASSIUM  4.3  3.1*   CR  0.86  0.89      IMPRESSION:                                                    Reason for surgery/procedure: trigger finger release  Diagnosis/reason for consult: maría-operative management    The proposed surgical procedure is  considered LOW risk.    REVISED CARDIAC RISK INDEX  The patient has the following serious cardiovascular risks for perioperative complications such as (MI, PE, VFib and 3  AV Block):  No serious cardiac risks  INTERPRETATION: 0 risks: Class I (very low risk - 0.4% complication rate)    The patient has the following additional risks for perioperative complications:  No identified additional risks      ICD-10-CM    1. Preop general physical exam Z01.818 EKG 12-lead complete w/read - Clinics     Basic metabolic panel   2. Trigger finger, acquired M65.30        RECOMMENDATIONS:                                                      --Patient is to take all scheduled medications on the day of surgery EXCEPT for modifications listed below.    ACE Inhibitor or Angiotensin Receptor Blocker (ARB) Use  Ace inhibitor or Angiotensin Receptor Blocker (ARB) and should HOLD this medication for the 24 hours prior to surgery.      APPROVAL GIVEN to proceed with proposed procedure, without further diagnostic evaluation       Signed Electronically by: Carli Ramirez MD    Copy of this evaluation report is provided to requesting physician.    [facility] [place] Preop Guidelines

## 2018-01-16 LAB
ANION GAP SERPL CALCULATED.3IONS-SCNC: 9 MMOL/L (ref 3–14)
BUN SERPL-MCNC: 15 MG/DL (ref 7–30)
CALCIUM SERPL-MCNC: 8.9 MG/DL (ref 8.5–10.1)
CHLORIDE SERPL-SCNC: 105 MMOL/L (ref 94–109)
CO2 SERPL-SCNC: 24 MMOL/L (ref 20–32)
CREAT SERPL-MCNC: 0.76 MG/DL (ref 0.52–1.04)
GFR SERPL CREATININE-BSD FRML MDRD: 77 ML/MIN/1.7M2
GLUCOSE SERPL-MCNC: 86 MG/DL (ref 70–99)
POTASSIUM SERPL-SCNC: 4 MMOL/L (ref 3.4–5.3)
SODIUM SERPL-SCNC: 138 MMOL/L (ref 133–144)

## 2018-02-05 DIAGNOSIS — F33.1 MAJOR DEPRESSIVE DISORDER, RECURRENT EPISODE, MODERATE (H): ICD-10-CM

## 2018-02-07 RX ORDER — PAROXETINE 40 MG/1
TABLET, FILM COATED ORAL
Qty: 90 TABLET | Refills: 0 | Status: SHIPPED | OUTPATIENT
Start: 2018-02-07 | End: 2019-06-21

## 2018-02-07 NOTE — TELEPHONE ENCOUNTER
Sent in 90 day supply per pharmacy request.     Diane Irby RN -- [facility]

## 2018-02-09 ENCOUNTER — TRANSFERRED RECORDS (OUTPATIENT)
Dept: HEALTH INFORMATION MANAGEMENT | Facility: CLINIC | Age: 63
End: 2018-02-09

## 2018-03-14 ENCOUNTER — TRANSFERRED RECORDS (OUTPATIENT)
Dept: HEALTH INFORMATION MANAGEMENT | Facility: CLINIC | Age: 63
End: 2018-03-14

## 2018-06-08 ENCOUNTER — OFFICE VISIT (OUTPATIENT)
Dept: PEDIATRICS | Facility: CLINIC | Age: 63
End: 2018-06-08
Payer: COMMERCIAL

## 2018-06-08 VITALS
SYSTOLIC BLOOD PRESSURE: 178 MMHG | BODY MASS INDEX: 38.93 KG/M2 | DIASTOLIC BLOOD PRESSURE: 80 MMHG | WEIGHT: 228 LBS | OXYGEN SATURATION: 96 % | HEART RATE: 61 BPM | HEIGHT: 64 IN | TEMPERATURE: 98.2 F

## 2018-06-08 DIAGNOSIS — I10 BENIGN ESSENTIAL HYPERTENSION: ICD-10-CM

## 2018-06-08 DIAGNOSIS — F33.1 MAJOR DEPRESSIVE DISORDER, RECURRENT EPISODE, MODERATE (H): Primary | ICD-10-CM

## 2018-06-08 DIAGNOSIS — M25.562 ACUTE PAIN OF LEFT KNEE: ICD-10-CM

## 2018-06-08 DIAGNOSIS — E66.01 MORBID OBESITY, UNSPECIFIED OBESITY TYPE (H): ICD-10-CM

## 2018-06-08 PROCEDURE — 99214 OFFICE O/P EST MOD 30 MIN: CPT | Performed by: PEDIATRICS

## 2018-06-08 RX ORDER — LISINOPRIL 20 MG/1
20 TABLET ORAL DAILY
Qty: 30 TABLET | Refills: 1 | Status: SHIPPED | OUTPATIENT
Start: 2018-06-08 | End: 2018-08-06

## 2018-06-08 RX ORDER — VENLAFAXINE HYDROCHLORIDE 37.5 MG/1
75 TABLET, EXTENDED RELEASE ORAL DAILY
Qty: 90 TABLET | Refills: 0 | Status: SHIPPED | OUTPATIENT
Start: 2018-06-08 | End: 2018-07-24

## 2018-06-08 NOTE — PROGRESS NOTES
"  SUBJECTIVE:   Lilia Lilly is a 63 year old female who presents to clinic today for the following health issues:    Sore left knee X 2 weeks, playing with grand kids and  hurt herself .is still bothersome. Pain is medial below knee, upper shin.  Ok when not moving, but hurts when she bears weight.  Right now not using anything on the knee.      Hypertension Follow-up      Outpatient blood pressures are not being checked.    Low Salt Diet: low salt      Amount of exercise or physical activity: Walks daily    Problems taking medications regularly: No    Medication side effects: none    Diet: regular (no restrictions)    No CP, HA         PROBLEMS TO ADD ON...  Depression - celexa did not work for her, so she switched back to paxil over the winter, but now very concerned about weight gain.  She has also tried zoloft in the past which didn't help.  She has not been on SNRIs.  She would like to change    Problem list and histories reviewed & adjusted, as indicated.  Additional history: as documented    Reviewed and updated as needed this visit by clinical staff       Reviewed and updated as needed this visit by Provider         ROS:  Constitutional, HEENT, cardiovascular, pulmonary, gi and gu systems are negative, except as otherwise noted.    OBJECTIVE:     /80 (BP Location: Right arm, Cuff Size: Adult Large)  Pulse 61  Temp 98.2  F (36.8  C) (Oral)  Ht 5' 4\" (1.626 m)  Wt 228 lb (103.4 kg)  SpO2 96%  BMI 39.14 kg/m2  Body mass index is 39.14 kg/(m^2).  GENERAL APPEARANCE: healthy, alert and no distress  RESP: lungs clear to auscultation - no rales, rhonchi or wheezes  CV: regular rates and rhythm, normal S1 S2, no S3 or S4 and no murmur, click or rub    Diagnostic Test Results:  none     ASSESSMENT/PLAN:       1. Major depressive disorder, recurrent episode, moderate (H)  Uncontrolled - due to weight gain with paxil will switch to SNRI with equivalent dosage  - venlafaxine (EFFEXOR-ER) 37.5 MG TB24 24 " hr tablet; Take 2 tablets (75 mg) by mouth daily  Dispense: 90 tablet; Refill: 0    2. Benign essential hypertension  Uncontrolled - see PI  - lisinopril (PRINIVIL/ZESTRIL) 20 MG tablet; Take 1 tablet (20 mg) by mouth daily  Dispense: 30 tablet; Refill: 1  - Basic metabolic panel; Future    3. Acute pain of left knee  See PI    4. Morbid obesity, unspecified obesity type (H)      Patient Instructions   If left knee not improving in 2 weeks, please let me know and we will order MRI.    Blood pressure - increase lisinopril to 20mg and follow up with nurse only and lab only in 1-2 weeks.    Stop Paxil and start Effexor.  Follow up with me in 6 weeks.      Teagan Ann MD  [facility]

## 2018-06-08 NOTE — PATIENT INSTRUCTIONS
If left knee not improving in 2 weeks, please let me know and we will order MRI.    Blood pressure - increase lisinopril to 20mg and follow up with nurse only and lab only in 1-2 weeks.    Stop Paxil and start Effexor.  Follow up with me in 6 weeks.

## 2018-06-08 NOTE — LETTER
[facility]  [address]  [address]  [place] [address] [phone]        October 8, 2018    Lilia Lilly  [address]   [place] [address]              Dear Lilia Lilly    This is to remind you that your Basic profile is due.    You may call our office at [phone] to schedule an appointment.    Please disregard this notice if you have already had your labs drawn or made an appointment.        Sincerely,        Teagan Ann MD/TRAY

## 2018-06-08 NOTE — MR AVS SNAPSHOT
After Visit Summary   6/8/2018    Lilia Lilly    MRN: [id number]           Patient Information     Date Of Birth          1955        Visit Information        Provider Department      6/8/2018 3:00 PM [facility] MD [facility]        Today's Diagnoses     Major depressive disorder, recurrent episode, moderate (H)    -  1    Benign essential hypertension          Care Instructions    If left knee not improving in 2 weeks, please let me know and we will order MRI.    Blood pressure - increase lisinopril to 20mg and follow up with nurse only and lab only in 1-2 weeks.    Stop Paxil and start Effexor.  Follow up with me in 6 weeks.          Follow-ups after your visit        Follow-up notes from your care team     Return in about 6 weeks (around 7/20/2018) for med check.      Future tests that were ordered for you today     Open Future Orders        Priority Expected Expires Ordered    Basic metabolic panel Routine 6/22/2018 8/9/2018 6/8/2018            Who to contact     If you have questions or need follow up information about today's clinic visit or your schedule please contact [facility] [place] directly at [phone].  Normal or non-critical lab and imaging results will be communicated to you by MyChart, letter or phone within 4 business days after the clinic has received the results. If you do not hear from us within 7 days, please contact the clinic through [employer] or phone. If you have a critical or abnormal lab result, we will notify you by phone as soon as possible.  Submit refill requests through mPort or call your pharmacy and they will forward the refill request to us. Please allow 3 business days for your refill to be completed.          Additional Information About Your Visit        MyChart Information     mPort lets you send messages to your doctor, view your test results, renew your prescriptions, schedule appointments and more. To sign up, go to  [email or web address] . Click on \"Log in\" on the left side of the screen, which will take you to the Welcome page. Then click on \"Sign up Now\" on the right side of the page.     You will be asked to enter the access code listed below, as well as some personal information. Please follow the directions to create your username and password.     Your access code is: [email or web address]  Expires: 2018  3:47 PM     Your access code will  in 90 days. If you need help or a new code, please call your [place] clinic or [phone].        Care EveryWhere ID     This is your Care EveryWhere ID. This could be used by other organizations to access your [place] medical records  [id number]        Your Vitals Were     Pulse Temperature Height Pulse Oximetry BMI (Body Mass Index)       61 98.2  F (36.8  C) (Oral) 5' 4\" (1.626 m) 96% 39.14 kg/m2        Blood Pressure from Last 3 Encounters:   18 178/80   01/15/18 142/72   11/10/17 136/78    Weight from Last 3 Encounters:   18 228 lb (103.4 kg)   01/15/18 227 lb (103 kg)   11/10/17 221 lb (100.2 kg)                 Today's Medication Changes          These changes are accurate as of 18  3:47 PM.  If you have any questions, ask your nurse or doctor.               Start taking these medicines.        Dose/Directions    venlafaxine 37.5 MG Tb24 24 hr tablet   Commonly known as:  EFFEXOR-ER   Used for:  Major depressive disorder, recurrent episode, moderate (H)   Started by:  Teagan Ann MD        Dose:  75 mg   Take 2 tablets (75 mg) by mouth daily   Quantity:  90 tablet   Refills:  0         These medicines have changed or have updated prescriptions.        Dose/Directions    * lisinopril 10 MG tablet   Commonly known as:  PRINIVIL/ZESTRIL   This may have changed:  Another medication with the same name was added. Make sure you understand how and when to take each.   Used for:  Benign essential hypertension   Changed by:  Teagan Ann MD        " Dose:  10 mg   Take 1 tablet (10 mg) by mouth daily   Quantity:  90 tablet   Refills:  3       * lisinopril 20 MG tablet   Commonly known as:  PRINIVIL/ZESTRIL   This may have changed:  You were already taking a medication with the same name, and this prescription was added. Make sure you understand how and when to take each.   Used for:  Benign essential hypertension   Changed by:  Teagan Ann MD        Dose:  20 mg   Take 1 tablet (20 mg) by mouth daily   Quantity:  30 tablet   Refills:  1       * Notice:  This list has 2 medication(s) that are the same as other medications prescribed for you. Read the directions carefully, and ask your doctor or other care provider to review them with you.         Where to get your medicines      These medications were sent to [facility] [id number]  [place], [place] - [address] AT [facility]  [address] [place] [address]     Phone:  [phone]     lisinopril 20 MG tablet    venlafaxine 37.5 MG Tb24 24 hr tablet                Primary Care Provider Office Phone # Fax #    Teagan Ann [phone]       [facility] [place] [address]        Equal Access to Services     MAURICIO JORGENSEN AH: Hadii chip canchola hadasho Soomaali, waaxda luqadaha, qaybta kaalmada adeegyada, nabor mariscal. So [facility] [phone].    ATENCIÓN: Si habla español, tiene a wyatt disposición servicios gratuitos de asistencia lingüística. Llame al [phone].    We comply with applicable federal civil rights laws and [place] laws. We do not discriminate on the basis of race, color, national origin, age, disability, sex, sexual orientation, or gender identity.            Thank you!     Thank you for choosing [facility]  for your care. Our goal is always to provide you with excellent care. Hearing back from our patients is one way we can continue to improve our services. Please take a few minutes to complete the  written survey that you may receive in the mail after your visit with us. Thank you!             Your Updated Medication List - Protect others around you: Learn how to safely use, store and throw away your medicines at www.disposemBeamlyeds.org.          This list is accurate as of 6/8/18  3:47 PM.  Always use your most recent med list.                   Brand Name Dispense Instructions for use Diagnosis    cloNIDine 0.2 MG tablet    CATAPRES    180 tablet    Take 1 tablet (0.2 mg) by mouth 2 times daily    Generalized hyperhidrosis       fluticasone 27.5 MCG/SPRAY spray    VERAMYST    10 g    Spray 1-2 sprays into both nostrils daily    Non-allergic rhinitis       fluticasone 50 MCG/ACT spray    FLONASE    1 Bottle    Spray 1-2 sprays into both nostrils daily    Chronic vasomotor rhinitis       * lisinopril 10 MG tablet    PRINIVIL/ZESTRIL    90 tablet    Take 1 tablet (10 mg) by mouth daily    Benign essential hypertension       * lisinopril 20 MG tablet    PRINIVIL/ZESTRIL    30 tablet    Take 1 tablet (20 mg) by mouth daily    Benign essential hypertension       PARoxetine 40 MG tablet    PAXIL    90 tablet    TAKE 1 TABLET(40 MG) BY MOUTH DAILY    Major depressive disorder, recurrent episode, moderate (H)       venlafaxine 37.5 MG Tb24 24 hr tablet    EFFEXOR-ER    90 tablet    Take 2 tablets (75 mg) by mouth daily    Major depressive disorder, recurrent episode, moderate (H)       VITAMIN C PO           VITAMIN D (CHOLECALCIFEROL) PO      Take by mouth daily        * Notice:  This list has 2 medication(s) that are the same as other medications prescribed for you. Read the directions carefully, and ask your doctor or other care provider to review them with you.

## 2018-06-14 ENCOUNTER — TRANSFERRED RECORDS (OUTPATIENT)
Dept: HEALTH INFORMATION MANAGEMENT | Facility: CLINIC | Age: 63
End: 2018-06-14

## 2018-06-20 ENCOUNTER — TRANSFERRED RECORDS (OUTPATIENT)
Dept: HEALTH INFORMATION MANAGEMENT | Facility: CLINIC | Age: 63
End: 2018-06-20

## 2018-07-23 ENCOUNTER — OFFICE VISIT (OUTPATIENT)
Dept: PEDIATRICS | Facility: CLINIC | Age: 63
End: 2018-07-23
Payer: COMMERCIAL

## 2018-07-23 VITALS
BODY MASS INDEX: 37.73 KG/M2 | DIASTOLIC BLOOD PRESSURE: 80 MMHG | SYSTOLIC BLOOD PRESSURE: 156 MMHG | WEIGHT: 221 LBS | TEMPERATURE: 98 F | HEIGHT: 64 IN | HEART RATE: 75 BPM | OXYGEN SATURATION: 95 %

## 2018-07-23 DIAGNOSIS — M19.90 ARTHRITIS: ICD-10-CM

## 2018-07-23 DIAGNOSIS — F33.1 MAJOR DEPRESSIVE DISORDER, RECURRENT EPISODE, MODERATE (H): ICD-10-CM

## 2018-07-23 DIAGNOSIS — I10 BENIGN ESSENTIAL HYPERTENSION: Primary | ICD-10-CM

## 2018-07-23 LAB
ANION GAP SERPL CALCULATED.3IONS-SCNC: 7 MMOL/L (ref 3–14)
BUN SERPL-MCNC: 14 MG/DL (ref 7–30)
CALCIUM SERPL-MCNC: 8.8 MG/DL (ref 8.5–10.1)
CHLORIDE SERPL-SCNC: 108 MMOL/L (ref 94–109)
CO2 SERPL-SCNC: 25 MMOL/L (ref 20–32)
CREAT SERPL-MCNC: 0.72 MG/DL (ref 0.52–1.04)
GFR SERPL CREATININE-BSD FRML MDRD: 82 ML/MIN/1.7M2
GLUCOSE SERPL-MCNC: 105 MG/DL (ref 70–99)
POTASSIUM SERPL-SCNC: 3.9 MMOL/L (ref 3.4–5.3)
SODIUM SERPL-SCNC: 140 MMOL/L (ref 133–144)

## 2018-07-23 PROCEDURE — 86431 RHEUMATOID FACTOR QUANT: CPT | Performed by: PEDIATRICS

## 2018-07-23 PROCEDURE — 36415 COLL VENOUS BLD VENIPUNCTURE: CPT | Performed by: PEDIATRICS

## 2018-07-23 PROCEDURE — 80048 BASIC METABOLIC PNL TOTAL CA: CPT | Performed by: PEDIATRICS

## 2018-07-23 PROCEDURE — 99214 OFFICE O/P EST MOD 30 MIN: CPT | Performed by: PEDIATRICS

## 2018-07-23 RX ORDER — LISINOPRIL 40 MG/1
40 TABLET ORAL DAILY
Qty: 30 TABLET | Refills: 1 | Status: SHIPPED | OUTPATIENT
Start: 2018-07-23 | End: 2019-06-21

## 2018-07-23 NOTE — LETTER
[facility]  [address]  [place] [address]                  [phone]   July 25, 2018    Lilia Lilly  [address]  [facility] [address]      Dear Lilia,    Here is a summary of your recent test results:    Your rheumatoid factor was negative, meaning that your arthritis is the most common type: osteoarthritis.     Your electrolytes and kidney function are normal.     Your test results are enclosed.      Please contact me if you have any questions.           Thank you very much for choosing [facility]    Best regards,    Teagan Ann MD        Results for orders placed or performed in visit on 07/23/18   Basic metabolic panel   Result Value Ref Range    Sodium 140 133 - 144 mmol/L    Potassium 3.9 3.4 - 5.3 mmol/L    Chloride 108 94 - 109 mmol/L    Carbon Dioxide 25 20 - 32 mmol/L    Anion Gap 7 3 - 14 mmol/L    Glucose 105 (H) 70 - 99 mg/dL    Urea Nitrogen 14 7 - 30 mg/dL    Creatinine 0.72 0.52 - 1.04 mg/dL    GFR Estimate 82 >60 mL/min/1.7m2    GFR Estimate If Black >90 >60 mL/min/1.7m2    Calcium 8.8 8.5 - 10.1 mg/dL   Rheumatoid factor   Result Value Ref Range    Rheumatoid Factor <20 <20 IU/mL

## 2018-07-23 NOTE — PATIENT INSTRUCTIONS
Call insurance or ask pharmacist - is this the cheapest form of Effexor (venlafaxine - generic) - is it cheaper if not extended release?  Any other formulation cheaper?    For hands:  Recommend over the counter Aleve (naprosyn) - take 2 tablets twice daily.  Referral below for orthopedics for injections in hands.   You will be called to schedule orthopedics appt.    Blood pressure - increase lisinopril to 40mg - may take two of your current tablets until you  new prescription.     Make nurse only appt in 2 weeks for blood pressure check.

## 2018-07-23 NOTE — MR AVS SNAPSHOT
After Visit Summary   7/23/2018    Lilia Lilly    MRN: [id number]           Patient Information     Date Of Birth          1955        Visit Information        Provider Department      7/23/2018 7:40 AM Teagan Ann MD [facility]        Today's Diagnoses     Benign essential hypertension    -  1    Arthritis          Care Instructions    Call insurance or ask pharmacist - is this the cheapest form of Effexor (venlafaxine - generic) - is it cheaper if not extended release?  Any other formulation cheaper?    For hands:  Recommend over the counter Aleve (naprosyn) - take 2 tablets twice daily.  Referral below for orthopedics for injections in hands.   You will be called to schedule orthopedics appt.    Blood pressure - increase lisinopril to 40mg - may take two of your current tablets until you  new prescription.     Make nurse only appt in 2 weeks for blood pressure check.          Follow-ups after your visit        Additional Services     ORTHO  REFERRAL       Coverage of these services is subject to the terms and limitations of your health insurance plan. Please call member services at your health plan with any benefit or coverage questions.       [facility] is referring you to the Orthopedic  Services at [facility].       The  representative will assist you in the coordination of your orthopedic and musculoskeletal care as prescribed by your physician.    The  representative will call you within 24 hours or   You may contact the  representative at:   [phone] for [facility]  [phone] for [facility], and [facility]  [phone] for [facility]    Type of Referral : Non Surgical       Timeframe requested: Routine - would like injections in thumbs       If X-rays, CT or MRI's   have been performed, please contact the facility where they were done, to  "arrange for  prior to your scheduled appointment. Please bring this referral request to your appointment and present it to your specialist.                  Follow-up notes from your care team     Return in about 2 weeks (around 8/6/2018) for nurse only blood pressure check.      Who to contact     If you have questions or need follow up information about today's clinic visit or your schedule please contact [facility] [place] directly at [phone].  Normal or non-critical lab and imaging results will be communicated to you by MyChart, letter or phone within 4 business days after the clinic has received the results. If you do not hear from us within 7 days, please contact the clinic through [employer] or phone. If you have a critical or abnormal lab result, we will notify you by phone as soon as possible.  Submit refill requests through [employer] or call your pharmacy and they will forward the refill request to us. Please allow 3 business days for your refill to be completed.          Additional Information About Your Visit        Care EveryWhere ID     This is your Care EveryWhere ID. This could be used by other organizations to access your [place] medical records  [id number]        Your Vitals Were     Pulse Temperature Height Pulse Oximetry BMI (Body Mass Index)       75 98  F (36.7  C) (Oral) 5' 4\" (1.626 m) 95% 37.93 kg/m2        Blood Pressure from Last 3 Encounters:   07/23/18 156/80   06/08/18 178/80   01/15/18 142/72    Weight from Last 3 Encounters:   07/23/18 221 lb (100.2 kg)   06/08/18 228 lb (103.4 kg)   01/15/18 227 lb (103 kg)              We Performed the Following     Basic metabolic panel     ORTHO  REFERRAL     Rheumatoid factor          Today's Medication Changes          These changes are accurate as of 7/23/18  8:19 AM.  If you have any questions, ask your nurse or doctor.               These medicines have changed or have updated prescriptions.        Dose/Directions    * " lisinopril 10 MG tablet   Commonly known as:  PRINIVIL/ZESTRIL   This may have changed:  Another medication with the same name was added. Make sure you understand how and when to take each.   Used for:  Benign essential hypertension   Changed by:  Teagan Ann MD        Dose:  10 mg   Take 1 tablet (10 mg) by mouth daily   Quantity:  90 tablet   Refills:  3       * lisinopril 20 MG tablet   Commonly known as:  PRINIVIL/ZESTRIL   This may have changed:  Another medication with the same name was added. Make sure you understand how and when to take each.   Used for:  Benign essential hypertension   Changed by:  Teagan Ann MD        Dose:  20 mg   Take 1 tablet (20 mg) by mouth daily   Quantity:  30 tablet   Refills:  1       * lisinopril 40 MG tablet   Commonly known as:  PRINIVIL/ZESTRIL   This may have changed:  You were already taking a medication with the same name, and this prescription was added. Make sure you understand how and when to take each.   Used for:  Benign essential hypertension   Changed by:  Teagan Ann MD        Dose:  40 mg   Take 1 tablet (40 mg) by mouth daily   Quantity:  30 tablet   Refills:  1       * Notice:  This list has 3 medication(s) that are the same as other medications prescribed for you. Read the directions carefully, and ask your doctor or other care provider to review them with you.         Where to get your medicines      [id number]  [place] - [address] AT [facility] & [facility]  [address] [address]     Phone:  [phone]     lisinopril 40 MG tablet                Primary Care Provider Office Phone # Fax #    Teagan Ann [phone]       [address] [place] [address] [address] [place] [address]        Equal Access to Services     [facility] JORGENSEN AH: Eloy armendariz Sojazmyne, waaxda luqadaha, qaybta kaalmada adekathi, nabor mariscal. So [facility]  [phone].    ATENCIÓN: Si gemini erwin, tiene a wyatt disposición servicios gratuitos de asistencia lingüística. Jett real [phone].    We comply with applicable federal civil rights laws and [place] laws. We do not discriminate on the basis of race, color, national origin, age, disability, sex, sexual orientation, or gender identity.            Thank you!     Thank you for choosing [facility] [place]  for your care. Our goal is always to provide you with excellent care. Hearing back from our patients is one way we can continue to improve our services. Please take a few minutes to complete the written survey that you may receive in the mail after your visit with us. Thank you!             Your Updated Medication List - Protect others around you: Learn how to safely use, store and throw away your medicines at www.disposemymeds.org.          This list is accurate as of 7/23/18  8:19 AM.  Always use your most recent med list.                   Brand Name Dispense Instructions for use Diagnosis    cloNIDine 0.2 MG tablet    CATAPRES    180 tablet    Take 1 tablet (0.2 mg) by mouth 2 times daily    Generalized hyperhidrosis       fluticasone 27.5 MCG/SPRAY spray    VERAMYST    10 g    Spray 1-2 sprays into both nostrils daily    Non-allergic rhinitis       fluticasone 50 MCG/ACT spray    FLONASE    1 Bottle    Spray 1-2 sprays into both nostrils daily    Chronic vasomotor rhinitis       * lisinopril 10 MG tablet    PRINIVIL/ZESTRIL    90 tablet    Take 1 tablet (10 mg) by mouth daily    Benign essential hypertension       * lisinopril 20 MG tablet    PRINIVIL/ZESTRIL    30 tablet    Take 1 tablet (20 mg) by mouth daily    Benign essential hypertension       * lisinopril 40 MG tablet    PRINIVIL/ZESTRIL    30 tablet    Take 1 tablet (40 mg) by mouth daily    Benign essential hypertension       PARoxetine 40 MG tablet    PAXIL    90 tablet    TAKE 1 TABLET(40 MG) BY MOUTH DAILY    Major depressive disorder,  recurrent episode, moderate (H)       venlafaxine 37.5 MG Tb24 24 hr tablet    EFFEXOR-ER    90 tablet    Take 2 tablets (75 mg) by mouth daily    Major depressive disorder, recurrent episode, moderate (H)       VITAMIN C PO           VITAMIN D (CHOLECALCIFEROL) PO      Take by mouth daily        * Notice:  This list has 3 medication(s) that are the same as other medications prescribed for you. Read the directions carefully, and ask your doctor or other care provider to review them with you.

## 2018-07-24 ENCOUNTER — TELEPHONE (OUTPATIENT)
Dept: PEDIATRICS | Facility: CLINIC | Age: 63
End: 2018-07-24

## 2018-07-24 DIAGNOSIS — F33.1 MAJOR DEPRESSIVE DISORDER, RECURRENT EPISODE, MODERATE (H): ICD-10-CM

## 2018-07-24 LAB — RHEUMATOID FACT SER NEPH-ACNC: <20 IU/ML (ref 0–20)

## 2018-07-24 RX ORDER — VENLAFAXINE 75 MG/1
75 TABLET ORAL DAILY
Qty: 90 TABLET | Refills: 0 | Status: CANCELLED | OUTPATIENT
Start: 2018-07-24

## 2018-07-24 NOTE — TELEPHONE ENCOUNTER
Call to the pharmacy-patient needs a refill for this ( can state in comments to dispense in tablets, or capsules)    Capsules are cheaper.  Would have to send 75 mg per capsule.  Patient will be due soon.  T'd up, please send if ok.    Sol Montes RN  Message handled by Nurse Triage.

## 2018-07-24 NOTE — TELEPHONE ENCOUNTER
"Requested Prescriptions   Pending Prescriptions Disp Refills     venlafaxine (EFFEXOR-ER) 37.5 MG TB24 24 hr tablet [Pharmacy Med Name: VENLAFAXINE ER 37.5MG TABLETS] 90 tablet 0    Last Written Prescription Date:  6/8/2018  Last Fill Quantity: 90,  # refills: 0   Last office visit: 7/23/2018 with prescribing provider:  7/23/2018   Future Office Visit:   Next 5 appointments (look out 90 days)     Aug 06, 2018  3:00 PM CDT   Nurse [facility] with EA NURSE AB   [facility]    [address] [place] [address]  [address]  [facility] [address] [phone]                  Sig: TAKE 2 TABLETS(75 MG) BY MOUTH DAILY    Serotonin-Norepinephrine Reuptake Inhibitors  Failed    7/24/2018 11:35 AM  PHQ-9 SCORE 8/7/2017   Total Score 13     No flowsheet data found.             Failed - Blood pressure under 140/90 in past 12 months    BP Readings from Last 3 Encounters:   07/23/18 156/80   06/08/18 178/80   01/15/18 142/72                Failed - PHQ-9 score of less than 5 in past 6 months    Please review last PHQ-9 score.          Passed - Patient is age 18 or older       Passed - No active pregnancy on record       Passed - Normal serum creatinine on file in past 12 months    Recent Labs   Lab Test  07/23/18   0835   CR  0.72            Passed - No positive pregnancy test in past 12 months       Passed - Recent (6 mo) or future (30 days) visit within the authorizing provider's specialty    Patient had office visit in the last 6 months or has a visit in the next 30 days with authorizing provider or within the authorizing provider's specialty.  See \"Patient Info\" tab in inbasket, or \"Choose Columns\" in Meds & Orders section of the refill encounter.              "

## 2018-07-24 NOTE — TELEPHONE ENCOUNTER
Reason for Call:  Other prescription    Detailed comments: would like to change venlafaxine  From 75mg a day and the pharmacy said that the 75mg cuples would be cheaper. Please change from tab to capsule.    [facility] - [place], [place] - [address] AT [facility]    Please call the pt back with decision.    Phone Number Patient can be reached at: Home number on file [phone] (home)    Best Time: any    Can we leave a detailed message on this number? YES    Call taken on 7/24/2018 at 11:23 AM by Natali Sanchez

## 2018-07-25 RX ORDER — VENLAFAXINE HYDROCHLORIDE 75 MG/1
75 CAPSULE, EXTENDED RELEASE ORAL DAILY
Qty: 90 CAPSULE | Refills: 1 | Status: SHIPPED | OUTPATIENT
Start: 2018-07-25 | End: 2019-06-21

## 2018-07-25 NOTE — TELEPHONE ENCOUNTER
Left detailed message for patient with the info below.  Sol Montes RN  Message handled by Nurse Triage.

## 2018-07-25 NOTE — TELEPHONE ENCOUNTER
Capsules sent. She needs follow up with me in 6 months for her depression.    Please let patient know as indicated below.    Thanks!    Teagan Ann MD  Internal Medicine/Pediatrics  [facility]

## 2018-07-27 RX ORDER — VENLAFAXINE HYDROCHLORIDE 37.5 MG/1
TABLET, EXTENDED RELEASE ORAL
Qty: 90 TABLET | Refills: 0 | Status: SHIPPED | OUTPATIENT
Start: 2018-07-27 | End: 2018-08-31

## 2018-07-27 ASSESSMENT — PATIENT HEALTH QUESTIONNAIRE - PHQ9: 5. POOR APPETITE OR OVEREATING: NOT AT ALL

## 2018-07-27 ASSESSMENT — ANXIETY QUESTIONNAIRES
GAD7 TOTAL SCORE: 8
1. FEELING NERVOUS, ANXIOUS, OR ON EDGE: SEVERAL DAYS
5. BEING SO RESTLESS THAT IT IS HARD TO SIT STILL: NOT AT ALL
3. WORRYING TOO MUCH ABOUT DIFFERENT THINGS: SEVERAL DAYS
7. FEELING AFRAID AS IF SOMETHING AWFUL MIGHT HAPPEN: SEVERAL DAYS
2. NOT BEING ABLE TO STOP OR CONTROL WORRYING: NEARLY EVERY DAY
6. BECOMING EASILY ANNOYED OR IRRITABLE: MORE THAN HALF THE DAYS
IF YOU CHECKED OFF ANY PROBLEMS ON THIS QUESTIONNAIRE, HOW DIFFICULT HAVE THESE PROBLEMS MADE IT FOR YOU TO DO YOUR WORK, TAKE CARE OF THINGS AT HOME, OR GET ALONG WITH OTHER PEOPLE: SOMEWHAT DIFFICULT

## 2018-07-27 NOTE — TELEPHONE ENCOUNTER
Medication is being filled for 1 time refill only due to:  Needs updated PHQ9      Afua Florence, RN

## 2018-07-28 ASSESSMENT — ANXIETY QUESTIONNAIRES: GAD7 TOTAL SCORE: 8

## 2018-07-28 ASSESSMENT — PATIENT HEALTH QUESTIONNAIRE - PHQ9: SUM OF ALL RESPONSES TO PHQ QUESTIONS 1-9: 10

## 2018-08-06 ENCOUNTER — ALLIED HEALTH/NURSE VISIT (OUTPATIENT)
Dept: NURSING | Facility: CLINIC | Age: 63
End: 2018-08-06
Payer: COMMERCIAL

## 2018-08-06 ENCOUNTER — RADIANT APPOINTMENT (OUTPATIENT)
Dept: MAMMOGRAPHY | Facility: CLINIC | Age: 63
End: 2018-08-06
Payer: COMMERCIAL

## 2018-08-06 ENCOUNTER — OFFICE VISIT (OUTPATIENT)
Dept: PEDIATRICS | Facility: CLINIC | Age: 63
End: 2018-08-06
Payer: COMMERCIAL

## 2018-08-06 VITALS — DIASTOLIC BLOOD PRESSURE: 92 MMHG | HEART RATE: 90 BPM | SYSTOLIC BLOOD PRESSURE: 160 MMHG

## 2018-08-06 DIAGNOSIS — R61 GENERALIZED HYPERHIDROSIS: ICD-10-CM

## 2018-08-06 DIAGNOSIS — Z12.31 VISIT FOR SCREENING MAMMOGRAM: ICD-10-CM

## 2018-08-06 DIAGNOSIS — I10 BENIGN ESSENTIAL HYPERTENSION: Primary | ICD-10-CM

## 2018-08-06 PROCEDURE — 99207 ZZC NO CHARGE NURSE ONLY: CPT

## 2018-08-06 PROCEDURE — 77067 SCR MAMMO BI INCL CAD: CPT | Mod: TC

## 2018-08-06 PROCEDURE — 99213 OFFICE O/P EST LOW 20 MIN: CPT | Performed by: PEDIATRICS

## 2018-08-06 RX ORDER — CLONIDINE HYDROCHLORIDE 0.2 MG/1
0.4 TABLET ORAL AT BEDTIME
Qty: 180 TABLET | Refills: 3 | Status: SHIPPED | OUTPATIENT
Start: 2018-08-06 | End: 2019-07-26

## 2018-08-06 RX ORDER — CLONIDINE HYDROCHLORIDE 0.2 MG/1
0.2 TABLET ORAL 2 TIMES DAILY
Qty: 180 TABLET | Refills: 3 | Status: CANCELLED | OUTPATIENT
Start: 2018-08-06

## 2018-08-06 NOTE — PROGRESS NOTES
SUBJECTIVE:   Lilia Lilly is a 63 year old female who presents to clinic today for the following health issues    Hypertension Follow-up      Outpatient blood pressures are not being checked.    Low Salt Diet: not monitoring salt      Amount of exercise or physical activity:     Problems taking medications regularly: No    Medication side effects: none    Diet: regular (no restrictions)    Patient has been taking the lisniopril 40mg w/o problems.  She has been out of her clonidine (which she takes for sweating) for the past day.  Denies HA, CP or shortness of breath.     Problem list and histories reviewed & adjusted, as indicated.  Additional history: as documented    Reviewed and updated as needed this visit by clinical staff  Allergies  Meds       Reviewed and updated as needed this visit by Provider         ROS:  Constitutional, HEENT, cardiovascular, pulmonary, gi and gu systems are negative, except as otherwise noted.    OBJECTIVE:     There were no vitals taken for this visit.  There is no height or weight on file to calculate BMI.     Vitals from nurse only visit today    GENERAL APPEARANCE: healthy, alert and no distress  CV: regular rates and rhythm, normal S1 S2, no S3 or S4 and no murmur, click or rub    Diagnostic Test Results:  none     ASSESSMENT/PLAN:       1. Generalized hyperhidrosis  refill  - cloNIDine (CATAPRES) 0.2 MG tablet; Take 2 tablets (0.4 mg) by mouth At Bedtime  Dispense: 180 tablet; Refill: 3    2. Benign essential hypertension  See PI      Patient Instructions   Restart clonidine.    Nurse only visit later this week or next. Nurse only appt.    If blood pressure still elevated, we will plan to change you medicine to prinzide (combination pill with HYDROCHLOROTHIAZIDE and lisinopril).    Teagan Ann MD  Internal Medicine/Pediatrics  [facility]          Teagan Ann MD  [facility]

## 2018-08-06 NOTE — MR AVS SNAPSHOT
After Visit Summary   8/6/2018    Lilia Lilly    MRN: [id number]           Patient Information     Date Of Birth          1955        Visit Information        Provider Department      8/6/2018 3:00 PM ALICIA NURSE AB [facility]        Today's Diagnoses     Benign essential hypertension    -  1       Follow-ups after your visit        Who to contact     If you have questions or need follow up information about today's clinic visit or your schedule please [facility] directly at [phone].  Normal or non-critical lab and imaging results will be communicated to you by MyChart, letter or phone within 4 business days after the clinic has received the results. If you do not hear from us within 7 days, please contact the clinic through MyChart or phone. If you have a critical or abnormal lab result, we will notify you by phone as soon as possible.  Submit refill requests through [employer] or call your pharmacy and they will forward the refill request to us. Please allow 3 business days for your refill to be completed.          Additional Information About Your Visit        Care EveryWhere ID     This is your Care EveryWhere ID. This could be used by other organizations to access your [place] medical records  [id number]        Your Vitals Were     Pulse                   90            Blood Pressure from Last 3 Encounters:   08/06/18 (!) 160/92   07/23/18 156/80   06/08/18 178/80    Weight from Last 3 Encounters:   07/23/18 221 lb (100.2 kg)   06/08/18 228 lb (103.4 kg)   01/15/18 227 lb (103 kg)              Today, you had the following     No orders found for display       Primary Care Provider Office Phone # Fax #    Teagan Ann [phone] [address] [place] [address]        Equal Access to Services     [facility] AH: Eloy Srinivasan, jose rogel, nabor justice  kristen fuentes ah. So [facility] [phone].    ATENCIÓN: Si gemini erwin, tiene a wyatt disposición servicios gratuitos de asistencia lingüística. Jett al [phone].    We comply with applicable federal civil rights laws and [place] laws. We do not discriminate on the basis of race, color, national origin, age, disability, sex, sexual orientation, or gender identity.            Thank you!     Thank you for choosing [facility] [place]  for your care. Our goal is always to provide you with excellent care. Hearing back from our patients is one way we can continue to improve our services. Please take a few minutes to complete the written survey that you may receive in the mail after your visit with us. Thank you!             Your Updated Medication List - Protect others around you: Learn how to safely use, store and throw away your medicines at www.disposemymeds.org.          This list is accurate as of 8/6/18  3:48 PM.  Always use your most recent med list.                   Brand Name Dispense Instructions for use Diagnosis    fluticasone 27.5 MCG/SPRAY spray    VERAMYST    10 g    Spray 1-2 sprays into both nostrils daily    Non-allergic rhinitis       fluticasone 50 MCG/ACT spray    FLONASE    1 Bottle    Spray 1-2 sprays into both nostrils daily    Chronic vasomotor rhinitis       lisinopril 40 MG tablet    PRINIVIL/ZESTRIL    30 tablet    Take 1 tablet (40 mg) by mouth daily    Benign essential hypertension       PARoxetine 40 MG tablet    PAXIL    90 tablet    TAKE 1 TABLET(40 MG) BY MOUTH DAILY    Major depressive disorder, recurrent episode, moderate (H)       * venlafaxine 75 MG 24 hr capsule    EFFEXOR-XR    90 capsule    Take 1 capsule (75 mg) by mouth daily    Major depressive disorder, recurrent episode, moderate (H)       * venlafaxine 37.5 MG Tb24 24 hr tablet    EFFEXOR-ER    90 tablet    TAKE 2 TABLETS(75 MG) BY MOUTH DAILY    Major depressive disorder, recurrent episode, moderate (H)       VITAMIN  C PO           VITAMIN D (CHOLECALCIFEROL) PO      Take by mouth daily        * Notice:  This list has 2 medication(s) that are the same as other medications prescribed for you. Read the directions carefully, and ask your doctor or other care provider to review them with you.

## 2018-08-06 NOTE — PATIENT INSTRUCTIONS
Restart clonidine.    Nurse only visit later this week or next. Nurse only appt.    If blood pressure still elevated, we will plan to change you medicine to prinzide (combination pill with HYDROCHLOROTHIAZIDE and lisinopril).    Teagan Ann MD  Internal Medicine/Pediatrics  [facility]

## 2018-08-06 NOTE — MR AVS SNAPSHOT
After Visit Summary   8/6/2018    Lilia Lilly    MRN: [id number]           Patient Information     Date Of Birth          1955        Visit Information        Provider Department      8/6/2018 2:20 PM [facility]        Today's Diagnoses     Generalized hyperhidrosis          Care Instructions    Restart clonidine.    Nurse only visit later this week or next. Nurse only appt.    If blood pressure still elevated, we will plan to change you medicine to prinzide (combination pill with HYDROCHLOROTHIAZIDE and lisinopril).    Teagan Ann MD  Internal Medicine/Pediatrics  [facility]              Follow-ups after your visit        Follow-up notes from your care team     Return for BP Recheck.      Who to contact     If you have questions or need follow up information about today's clinic visit or your schedule please contact [facility] directly at [phone].  Normal or non-critical lab and imaging results will be communicated to you by MyChart, letter or phone within 4 business days after the clinic has received the results. If you do not hear from us within 7 days, please contact the clinic through MyChart or phone. If you have a critical or abnormal lab result, we will notify you by phone as soon as possible.  Submit refill requests through [employer] or call your pharmacy and they will forward the refill request to us. Please allow 3 business days for your refill to be completed.          Additional Information About Your Visit        Care EveryWhere ID     This is your Care EveryWhere ID. This could be used by other organizations to access your [place] medical records  [id number]         Blood Pressure from Last 3 Encounters:   08/06/18 (!) 160/92   07/23/18 156/80   06/08/18 178/80    Weight from Last 3 Encounters:   07/23/18 221 lb (100.2 kg)   06/08/18 228 lb (103.4 kg)   01/15/18 227 lb (103 kg)              Today, you had the  following     No orders found for display         Today's Medication Changes          These changes are accurate as of 8/6/18  3:56 PM.  If you have any questions, ask your nurse or doctor.               These medicines have changed or have updated prescriptions.        Dose/Directions    cloNIDine 0.2 MG tablet   Commonly known as:  CATAPRES   This may have changed:    - how much to take  - when to take this   Used for:  Generalized hyperhidrosis   Changed by:  Teagan Ann MD        Dose:  0.4 mg   Take 2 tablets (0.4 mg) by mouth At Bedtime   Quantity:  180 tablet   Refills:  3            Where to get your medicines      [facility]  [place] - [address] AT [facility]  [address] [address]     Phone:  [phone]     cloNIDine 0.2 MG tablet                Primary Care Provider Office Phone # Fax #    Teagan Ann [phone]       [facility] [place] [address]        Equal Access to Services     [facility] AH: Hadii aad ku hadasho Soomaali, waaxda luqadaha, qaybta kaalmada adeegyada, waxay idiin hayaan agapito khtheo fuentes . So [facility] [phone].    ATENCIÓN: Si habla español, tiene a wyatt disposición servicios gratuitos de asistencia lingüística. [facility] [phone].    We comply with applicable federal civil rights laws and [place] laws. We do not discriminate on the basis of race, color, national origin, age, disability, sex, sexual orientation, or gender identity.            Thank you!     Thank you for choosing [facility]  for your care. Our goal is always to provide you with excellent care. Hearing back from our patients is one way we can continue to improve our services. Please take a few minutes to complete the written survey that you may receive in the mail after your visit with us. Thank you!             Your Updated Medication List - Protect others around you:  Learn how to safely use, store and throw away your medicines at www.disposemymeds.org.          This list is accurate as of 8/6/18  3:56 PM.  Always use your most recent med list.                   Brand Name Dispense Instructions for use Diagnosis    cloNIDine 0.2 MG tablet    CATAPRES    180 tablet    Take 2 tablets (0.4 mg) by mouth At Bedtime    Generalized hyperhidrosis       fluticasone 27.5 MCG/SPRAY spray    VERAMYST    10 g    Spray 1-2 sprays into both nostrils daily    Non-allergic rhinitis       fluticasone 50 MCG/ACT spray    FLONASE    1 Bottle    Spray 1-2 sprays into both nostrils daily    Chronic vasomotor rhinitis       lisinopril 40 MG tablet    PRINIVIL/ZESTRIL    30 tablet    Take 1 tablet (40 mg) by mouth daily    Benign essential hypertension       PARoxetine 40 MG tablet    PAXIL    90 tablet    TAKE 1 TABLET(40 MG) BY MOUTH DAILY    Major depressive disorder, recurrent episode, moderate (H)       * venlafaxine 75 MG 24 hr capsule    EFFEXOR-XR    90 capsule    Take 1 capsule (75 mg) by mouth daily    Major depressive disorder, recurrent episode, moderate (H)       * venlafaxine 37.5 MG Tb24 24 hr tablet    EFFEXOR-ER    90 tablet    TAKE 2 TABLETS(75 MG) BY MOUTH DAILY    Major depressive disorder, recurrent episode, moderate (H)       VITAMIN C PO           VITAMIN D (CHOLECALCIFEROL) PO      Take by mouth daily        * Notice:  This list has 2 medication(s) that are the same as other medications prescribed for you. Read the directions carefully, and ask your doctor or other care provider to review them with you.

## 2018-08-06 NOTE — PROGRESS NOTES
Lilia Lilly is a 63 year old patient who comes in today for a Blood Pressure check.  Initial BP:  BP (!) 160/92  Pulse 90     Data Unavailable  Disposition: provider notified while patient in the clinic, Dr. Ann will be seeing patient for follow up.

## 2018-08-10 ENCOUNTER — TRANSFERRED RECORDS (OUTPATIENT)
Dept: HEALTH INFORMATION MANAGEMENT | Facility: CLINIC | Age: 63
End: 2018-08-10

## 2018-08-13 ENCOUNTER — ALLIED HEALTH/NURSE VISIT (OUTPATIENT)
Dept: NURSING | Facility: CLINIC | Age: 63
End: 2018-08-13
Payer: COMMERCIAL

## 2018-08-13 ENCOUNTER — TELEPHONE (OUTPATIENT)
Dept: PEDIATRICS | Facility: CLINIC | Age: 63
End: 2018-08-13

## 2018-08-13 VITALS — DIASTOLIC BLOOD PRESSURE: 80 MMHG | HEART RATE: 70 BPM | SYSTOLIC BLOOD PRESSURE: 164 MMHG

## 2018-08-13 DIAGNOSIS — I10 BENIGN ESSENTIAL HYPERTENSION: ICD-10-CM

## 2018-08-13 DIAGNOSIS — I10 BENIGN ESSENTIAL HYPERTENSION: Primary | ICD-10-CM

## 2018-08-13 PROCEDURE — 99207 ZZC NO CHARGE NURSE ONLY: CPT

## 2018-08-13 RX ORDER — LISINOPRIL AND HYDROCHLOROTHIAZIDE 12.5; 2 MG/1; MG/1
2 TABLET ORAL DAILY
Qty: 60 TABLET | Refills: 1 | Status: SHIPPED | OUTPATIENT
Start: 2018-08-13 | End: 2018-11-11

## 2018-08-13 NOTE — TELEPHONE ENCOUNTER
Prinzide sent to pharmacy - this is to replace her lisinopril.    Please have her make nurse only and lab only in 2 weeks.    Teagan Ann MD  Internal Medicine/Pediatrics  [facility]

## 2018-08-13 NOTE — MR AVS SNAPSHOT
After Visit Summary   8/13/2018    Lilia Lilly    MRN: [id number]           Patient Information     Date Of Birth          1955        Visit Information        Provider Department      8/13/2018 3:00 PM ALICIA NURSE AB [facility] [place]        Today's Diagnoses     Benign essential hypertension    -  1       Follow-ups after your visit        Who to contact     If you have questions or need follow up information about today's clinic visit or your schedule please contact [facility] directly at [phone].  Normal or non-critical lab and imaging results will be communicated to you by MyChart, letter or phone within 4 business days after the clinic has received the results. If you do not hear from us within 7 days, please contact the clinic through MyChart or phone. If you have a critical or abnormal lab result, we will notify you by phone as soon as possible.  Submit refill requests through [facility] or call your pharmacy and they will forward the refill request to us. Please allow 3 business days for your refill to be completed.          Additional Information About Your Visit        Care EveryWhere ID     This is your Care EveryWhere ID. This could be used by other organizations to access your [place] medical records  [id number]        Your Vitals Were     Pulse                   70            Blood Pressure from Last 3 Encounters:   08/30/18 138/66   08/13/18 164/80   08/06/18 (!) 160/92    Weight from Last 3 Encounters:   08/30/18 214 lb 6.4 oz (97.3 kg)   07/23/18 221 lb (100.2 kg)   06/08/18 228 lb (103.4 kg)              Today, you had the following     No orders found for display         Today's Medication Changes          These changes are accurate as of 8/13/18 11:59 PM.  If you have any questions, ask your nurse or doctor.               Start taking these medicines.        Dose/Directions    lisinopril-hydrochlorothiazide 20-12.5 MG per tablet   Commonly known as:   PRINZIDE/ZESTORETIC   Used for:  Benign essential hypertension   Started by:  Teagan Ann MD        Dose:  2 tablet   Take 2 tablets by mouth daily   Quantity:  60 tablet   Refills:  1            Where to get your medicines      These medications were sent to [id number] - [place], [place] - [address] AT [facility]  [address], [place] [address]     Phone:  [phone]     lisinopril-hydrochlorothiazide 20-12.5 MG per tablet                Primary Care Provider Office Phone # Fax #    Teagan Ann [phone] [address] [place] [address]        Equal Access to Services     [facility] AH: Hadii chip canchola hadasho Soomaali, waaxda luqadaha, qaybta kaalmada adeegyada, nabor fuentes . So [facility] [phone].    ATENCIÓN: Si habla español, tiene a wyatt disposición servicios gratuitos de asistencia lingüística. [facility] [phone].    We comply with applicable federal civil rights laws and [place] laws. We do not discriminate on the basis of race, color, national origin, age, disability, sex, sexual orientation, or gender identity.            Thank you!     Thank you for choosing [facility]  for your care. Our goal is always to provide you with excellent care. Hearing back from our patients is one way we can continue to improve our services. Please take a few minutes to complete the written survey that you may receive in the mail after your visit with us. Thank you!             Your Updated Medication List - Protect others around you: Learn how to safely use, store and throw away your medicines at www.disposemymeds.org.          This list is accurate as of 8/13/18 11:59 PM.  Always use your most recent med list.                   Brand Name Dispense Instructions for use Diagnosis    cloNIDine 0.2 MG tablet    CATAPRES    180 tablet    Take 2 tablets (0.4 mg) by mouth At Bedtime    Generalized  hyperhidrosis       fluticasone 27.5 MCG/SPRAY spray    VERAMYST    10 g    Spray 1-2 sprays into both nostrils daily    Non-allergic rhinitis       fluticasone 50 MCG/ACT spray    FLONASE    1 Bottle    Spray 1-2 sprays into both nostrils daily    Chronic vasomotor rhinitis       lisinopril 40 MG tablet    PRINIVIL/ZESTRIL    30 tablet    Take 1 tablet (40 mg) by mouth daily    Benign essential hypertension       lisinopril-hydrochlorothiazide 20-12.5 MG per tablet    PRINZIDE/ZESTORETIC    60 tablet    Take 2 tablets by mouth daily    Benign essential hypertension       PARoxetine 40 MG tablet    PAXIL    90 tablet    TAKE 1 TABLET(40 MG) BY MOUTH DAILY    Major depressive disorder, recurrent episode, moderate (H)       venlafaxine 75 MG 24 hr capsule    EFFEXOR-XR    90 capsule    Take 1 capsule (75 mg) by mouth daily    Major depressive disorder, recurrent episode, moderate (H)       VITAMIN C PO           VITAMIN D (CHOLECALCIFEROL) PO      Take by mouth daily

## 2018-08-13 NOTE — NURSING NOTE
Lilia Lilly is a 63 year old patient who comes in today for a Blood Pressure check.  Initial BP:  /80  Pulse 70     70  Disposition: BP elevated.  Triage RN notified, patient asked to wait.Kathi PALOMO MA

## 2018-08-13 NOTE — TELEPHONE ENCOUNTER
BP today 164/80.  Per last visit note:  If blood pressure still elevated, we will plan to change you medicine to prinzide (combination pill with HYDROCHLOROTHIAZIDE and lisinopril).    Fadia Carter RN

## 2018-08-14 RX ORDER — LISINOPRIL AND HYDROCHLOROTHIAZIDE 12.5; 2 MG/1; MG/1
TABLET ORAL
Qty: 180 TABLET | Refills: 1 | OUTPATIENT
Start: 2018-08-14

## 2018-08-30 ENCOUNTER — TELEPHONE (OUTPATIENT)
Dept: PEDIATRICS | Facility: CLINIC | Age: 63
End: 2018-08-30

## 2018-08-30 ENCOUNTER — ALLIED HEALTH/NURSE VISIT (OUTPATIENT)
Dept: NURSING | Facility: CLINIC | Age: 63
End: 2018-08-30
Payer: COMMERCIAL

## 2018-08-30 VITALS
DIASTOLIC BLOOD PRESSURE: 66 MMHG | SYSTOLIC BLOOD PRESSURE: 138 MMHG | BODY MASS INDEX: 36.8 KG/M2 | WEIGHT: 214.4 LBS | HEART RATE: 68 BPM | OXYGEN SATURATION: 97 %

## 2018-08-30 DIAGNOSIS — I10 BENIGN ESSENTIAL HYPERTENSION: Primary | ICD-10-CM

## 2018-08-30 DIAGNOSIS — I10 BENIGN ESSENTIAL HYPERTENSION: ICD-10-CM

## 2018-08-30 DIAGNOSIS — F33.1 MAJOR DEPRESSIVE DISORDER, RECURRENT EPISODE, MODERATE (H): ICD-10-CM

## 2018-08-30 PROCEDURE — 99207 ZZC NO CHARGE NURSE ONLY: CPT

## 2018-08-30 PROCEDURE — 80048 BASIC METABOLIC PNL TOTAL CA: CPT | Performed by: PEDIATRICS

## 2018-08-30 PROCEDURE — 36415 COLL VENOUS BLD VENIPUNCTURE: CPT | Performed by: PEDIATRICS

## 2018-08-30 NOTE — MR AVS SNAPSHOT
After Visit Summary   8/30/2018    Lilia Lilly    MRN: [id number]           Patient Information     Date Of Birth          1955        Visit Information        Provider Department      8/30/2018 3:00 PM ALICIA NURSE AB [facility] [place]        Today's Diagnoses     Benign essential hypertension    -  1       Follow-ups after your visit        Who to contact     If you have questions or need follow up information about today's clinic visit or your schedule please contact [facility] directly at [phone].  Normal or non-critical lab and imaging results will be communicated to you by MyChart, letter or phone within 4 business days after the clinic has received the results. If you do not hear from us within 7 days, please contact the clinic through MyChart or phone. If you have a critical or abnormal lab result, we will notify you by phone as soon as possible.  Submit refill requests through [employer] or call your pharmacy and they will forward the refill request to us. Please allow 3 business days for your refill to be completed.          Additional Information About Your Visit        Care EveryWhere ID     This is your Care EveryWhere ID. This could be used by other organizations to access your [place] medical records  [id number]        Your Vitals Were     Pulse Pulse Oximetry BMI (Body Mass Index)             68 97% 36.8 kg/m2          Blood Pressure from Last 3 Encounters:   08/30/18 138/66   08/13/18 164/80   08/06/18 (!) 160/92    Weight from Last 3 Encounters:   08/30/18 214 lb 6.4 oz (97.3 kg)   07/23/18 221 lb (100.2 kg)   06/08/18 228 lb (103.4 kg)              Today, you had the following     No orders found for display       Primary Care Provider Office Phone # Fax #    Teagan Ann [phone] [address] [place] [address]        Equal Access to Services     MAURICIO JORGENSEN AH: jose Hilario,  nabor justice ah. So [facility] [phone].    ATENCIÓN: Si gemini erwin, tiene a wyatt disposición servicios gratuitos de asistencia lingüística. [facility]ett al [phone].    We comply with applicable federal civil rights laws and [place] laws. We do not discriminate on the basis of race, color, national origin, age, disability, sex, sexual orientation, or gender identity.            Thank you!     Thank you for choosing [facility] [place]  for your care. Our goal is always to provide you with excellent care. Hearing back from our patients is one way we can continue to improve our services. Please take a few minutes to complete the written survey that you may receive in the mail after your visit with us. Thank you!             Your Updated Medication List - Protect others around you: Learn how to safely use, store and throw away your medicines at www.disposemymeds.org.          This list is accurate as of 8/30/18  4:30 PM.  Always use your most recent med list.                   Brand Name Dispense Instructions for use Diagnosis    cloNIDine 0.2 MG tablet    CATAPRES    180 tablet    Take 2 tablets (0.4 mg) by mouth At Bedtime    Generalized hyperhidrosis       fluticasone 27.5 MCG/SPRAY spray    VERAMYST    10 g    Spray 1-2 sprays into both nostrils daily    Non-allergic rhinitis       fluticasone 50 MCG/ACT spray    FLONASE    1 Bottle    Spray 1-2 sprays into both nostrils daily    Chronic vasomotor rhinitis       lisinopril 40 MG tablet    PRINIVIL/ZESTRIL    30 tablet    Take 1 tablet (40 mg) by mouth daily    Benign essential hypertension       lisinopril-hydrochlorothiazide 20-12.5 MG per tablet    PRINZIDE/ZESTORETIC    60 tablet    Take 2 tablets by mouth daily    Benign essential hypertension       PARoxetine 40 MG tablet    PAXIL    90 tablet    TAKE 1 TABLET(40 MG) BY MOUTH DAILY    Major depressive disorder, recurrent episode, moderate (H)       *  venlafaxine 75 MG 24 hr capsule    EFFEXOR-XR    90 capsule    Take 1 capsule (75 mg) by mouth daily    Major depressive disorder, recurrent episode, moderate (H)       * venlafaxine 37.5 MG Tb24 24 hr tablet    EFFEXOR-ER    90 tablet    TAKE 2 TABLETS(75 MG) BY MOUTH DAILY    Major depressive disorder, recurrent episode, moderate (H)       VITAMIN C PO           VITAMIN D (CHOLECALCIFEROL) PO      Take by mouth daily        * Notice:  This list has 2 medication(s) that are the same as other medications prescribed for you. Read the directions carefully, and ask your doctor or other care provider to review them with you.

## 2018-08-30 NOTE — TELEPHONE ENCOUNTER
Pt presents to the clinic today to have a Nurse Only Blood Pressure Check. She had a recent change of medication on 8/6/18 to stop lisinopril and start lisinopril-hydrochlorothiazide 20-12.5 mg tablets which she says she takes at night. Her blood pressure is as follows:    BP Readings from Last 6 Encounters:   08/30/18 138/66   08/13/18 164/80   08/06/18 (!) 160/92   07/23/18 156/80   06/08/18 178/80   01/15/18 142/72     Today it was 138/66.    She had additional concerns regarding her Effexor she is taking stating she thought a potential side effect is high blood pressure. She also states she was instructed to switch to a cheaper alternative to the Effexor (non time release) that did not help her symptoms and states the time release worked and wants to know if she can continue the time released version of her Effexor. Please see medication list.     Forest Vera CMA ([facility])

## 2018-08-30 NOTE — NURSING NOTE
Lilia Lilly is a 63 year old patient who comes in today for a Blood Pressure check.  Initial BP:  /66 (BP Location: Right arm, Patient Position: Chair, Cuff Size: Adult Large)  Pulse 68  Wt 214 lb 6.4 oz (97.3 kg)  SpO2 97%  BMI 36.8 kg/m2     68  Disposition: results routed to provider.  Forest Vera CMA ([facility])

## 2018-08-31 LAB
ANION GAP SERPL CALCULATED.3IONS-SCNC: 9 MMOL/L (ref 3–14)
BUN SERPL-MCNC: 19 MG/DL (ref 7–30)
CALCIUM SERPL-MCNC: 9.3 MG/DL (ref 8.5–10.1)
CHLORIDE SERPL-SCNC: 102 MMOL/L (ref 94–109)
CO2 SERPL-SCNC: 28 MMOL/L (ref 20–32)
CREAT SERPL-MCNC: 0.84 MG/DL (ref 0.52–1.04)
GFR SERPL CREATININE-BSD FRML MDRD: 68 ML/MIN/1.7M2
GLUCOSE SERPL-MCNC: 86 MG/DL (ref 70–99)
POTASSIUM SERPL-SCNC: 4 MMOL/L (ref 3.4–5.3)
SODIUM SERPL-SCNC: 139 MMOL/L (ref 133–144)

## 2018-08-31 RX ORDER — VENLAFAXINE HYDROCHLORIDE 37.5 MG/1
TABLET, EXTENDED RELEASE ORAL
Qty: 180 TABLET | Refills: 1 | Status: SHIPPED | OUTPATIENT
Start: 2018-08-31 | End: 2019-06-01

## 2018-08-31 NOTE — TELEPHONE ENCOUNTER
Yes, Effexor carries a very small risk of hypertension (1%) - however if this is working for depression, I would recommend continuing.    We can switch back to the tabs (time release) - order sent to her pharmacy. (this was previously changed to capsules per patient request b/c they were cheaper.).    I would like to see her in 6 months for follow up.  Sooner if needed.    BP looked good yesterday!    Teagan Ann MD  Internal Medicine/Pediatrics  [facility]

## 2018-08-31 NOTE — TELEPHONE ENCOUNTER
Let patient know Dr. Ann's message. She is due for a yearly physical and will call back to schedule.  She will call back if she has any other questions or concerns.    Kenia Arora MA

## 2018-11-09 ENCOUNTER — OFFICE VISIT (OUTPATIENT)
Dept: PEDIATRICS | Facility: CLINIC | Age: 63
End: 2018-11-09
Payer: COMMERCIAL

## 2018-11-09 ENCOUNTER — RADIANT APPOINTMENT (OUTPATIENT)
Dept: GENERAL RADIOLOGY | Facility: CLINIC | Age: 63
End: 2018-11-09
Attending: PEDIATRICS
Payer: COMMERCIAL

## 2018-11-09 VITALS
HEART RATE: 76 BPM | DIASTOLIC BLOOD PRESSURE: 78 MMHG | BODY MASS INDEX: 36.88 KG/M2 | OXYGEN SATURATION: 96 % | TEMPERATURE: 98.5 F | HEIGHT: 64 IN | SYSTOLIC BLOOD PRESSURE: 152 MMHG | WEIGHT: 216 LBS

## 2018-11-09 DIAGNOSIS — M25.511 BILATERAL SHOULDER PAIN, UNSPECIFIED CHRONICITY: ICD-10-CM

## 2018-11-09 DIAGNOSIS — M54.2 NECK PAIN: ICD-10-CM

## 2018-11-09 DIAGNOSIS — K51.019 ULCERATIVE PANCOLITIS WITH COMPLICATION (H): ICD-10-CM

## 2018-11-09 DIAGNOSIS — M25.512 BILATERAL SHOULDER PAIN, UNSPECIFIED CHRONICITY: ICD-10-CM

## 2018-11-09 DIAGNOSIS — Z00.01 ENCOUNTER FOR GENERAL ADULT MEDICAL EXAMINATION WITH ABNORMAL FINDINGS: Primary | ICD-10-CM

## 2018-11-09 DIAGNOSIS — Z13.6 SCREENING FOR CARDIOVASCULAR CONDITION: ICD-10-CM

## 2018-11-09 DIAGNOSIS — Z13.1 SCREENING FOR DIABETES MELLITUS: ICD-10-CM

## 2018-11-09 DIAGNOSIS — I10 BENIGN ESSENTIAL HYPERTENSION: ICD-10-CM

## 2018-11-09 DIAGNOSIS — M15.8 OTHER OSTEOARTHRITIS INVOLVING MULTIPLE JOINTS: ICD-10-CM

## 2018-11-09 LAB
CHOLEST SERPL-MCNC: 185 MG/DL
GLUCOSE SERPL-MCNC: 98 MG/DL (ref 70–99)
HDLC SERPL-MCNC: 40 MG/DL
LDLC SERPL CALC-MCNC: 93 MG/DL
NONHDLC SERPL-MCNC: 145 MG/DL
TRIGL SERPL-MCNC: 260 MG/DL

## 2018-11-09 PROCEDURE — 36415 COLL VENOUS BLD VENIPUNCTURE: CPT | Performed by: PEDIATRICS

## 2018-11-09 PROCEDURE — 80061 LIPID PANEL: CPT | Performed by: PEDIATRICS

## 2018-11-09 PROCEDURE — 99213 OFFICE O/P EST LOW 20 MIN: CPT | Mod: 25 | Performed by: PEDIATRICS

## 2018-11-09 PROCEDURE — 82947 ASSAY GLUCOSE BLOOD QUANT: CPT | Performed by: PEDIATRICS

## 2018-11-09 PROCEDURE — 72040 X-RAY EXAM NECK SPINE 2-3 VW: CPT

## 2018-11-09 PROCEDURE — 99396 PREV VISIT EST AGE 40-64: CPT | Performed by: PEDIATRICS

## 2018-11-09 ASSESSMENT — ENCOUNTER SYMPTOMS
ABDOMINAL PAIN: 1
CHILLS: 0
HEMATOCHEZIA: 0
COUGH: 1
CONSTIPATION: 0
HEMATURIA: 0
DIARRHEA: 1
EYE PAIN: 0
DIZZINESS: 0

## 2018-11-09 ASSESSMENT — PATIENT HEALTH QUESTIONNAIRE - PHQ9
10. IF YOU CHECKED OFF ANY PROBLEMS, HOW DIFFICULT HAVE THESE PROBLEMS MADE IT FOR YOU TO DO YOUR WORK, TAKE CARE OF THINGS AT HOME, OR GET ALONG WITH OTHER PEOPLE: SOMEWHAT DIFFICULT
SUM OF ALL RESPONSES TO PHQ QUESTIONS 1-9: 8
SUM OF ALL RESPONSES TO PHQ QUESTIONS 1-9: 8

## 2018-11-09 NOTE — PATIENT INSTRUCTIONS
GI referral below.    Return for nurse only blood pressure check - double check your medication dose (See last page)    physical therapy referral below    Start voltaren gel on hands    Preventive Health Recommendations  Female Ages 50 - 64    Yearly exam: See your health care provider every year in order to  o Review health changes.   o Discuss preventive care.    o Review your medicines if your doctor has prescribed any.      Get a Pap test every three years (unless you have an abnormal result and your provider advises testing more often).    If you get Pap tests with HPV test, you only need to test every 5 years, unless you have an abnormal result.     You do not need a Pap test if your uterus was removed (hysterectomy) and you have not had cancer.    You should be tested each year for STDs (sexually transmitted diseases) if you're at risk.     Have a mammogram every 1 to 2 years.    Have a colonoscopy at age 50, or have a yearly FIT test (stool test). These exams screen for colon cancer.      Have a cholesterol test every 5 years, or more often if advised.    Have a diabetes test (fasting glucose) every three years. If you are at risk for diabetes, you should have this test more often.     If you are at risk for osteoporosis (brittle bone disease), think about having a bone density scan (DEXA).    Shots: Get a flu shot each year. Get a tetanus shot every 10 years.    Nutrition:     Eat at least 5 servings of fruits and vegetables each day.    Eat whole-grain bread, whole-wheat pasta and brown rice instead of white grains and rice.    Get adequate Calcium and Vitamin D.     Lifestyle    Exercise at least 150 minutes a week (30 minutes a day, 5 days a week). This will help you control your weight and prevent disease.    Limit alcohol to one drink per day.    No smoking.     Wear sunscreen to prevent skin cancer.     See your dentist every six months for an exam and cleaning.    See your eye doctor every 1 to 2  years.

## 2018-11-09 NOTE — LETTER
[facility]  [address]  [place] [address]                  [phone]   November 12, 2018    Lilia Lilly  [address]  [place] [address]      Dear Lilia,    Here is a summary of your recent test results:    Your cholesterol indicates that you would benefit from starting a statin medication - please make an appointment if you are interested in starting this medication.     Your blood sugar is normal.  You do not have diabetes.    Your test results are enclosed.      Please contact me if you have any questions.           Thank you very much for choosing [facility]    Best regards,    Teagan Ann MD        Results for orders placed or performed in visit on 11/09/18   Lipid Profile (Chol, Trig, HDL, LDL calc)   Result Value Ref Range    Cholesterol 185 <200 mg/dL    Triglycerides 260 (H) <150 mg/dL    HDL Cholesterol 40 (L) >49 mg/dL    LDL Cholesterol Calculated 93 <100 mg/dL    Non HDL Cholesterol 145 (H) <130 mg/dL   Glucose   Result Value Ref Range    Glucose 98 70 - 99 mg/dL

## 2018-11-09 NOTE — MR AVS SNAPSHOT
After Visit Summary   11/9/2018    Lilia Lilly    MRN: [id number]           Patient Information     Date Of Birth          1955        Visit Information        Provider Department      11/9/2018 10:00 AM Teagan Ann MD [facility]        Today's Diagnoses     Benign essential hypertension    -  1    Ulcerative pancolitis with complication (H)        Other osteoarthritis involving multiple joints        Neck pain        Bilateral shoulder pain, unspecified chronicity          Care Instructions    GI referral below.    Return for nurse only blood pressure check - double check your medication dose (See last page)    physical therapy referral below    Start voltaren gel on hands    Preventive Health Recommendations  Female Ages 50 - 64    Yearly exam: See your health care provider every year in order to  o Review health changes.   o Discuss preventive care.    o Review your medicines if your doctor has prescribed any.      Get a Pap test every three years (unless you have an abnormal result and your provider advises testing more often).    If you get Pap tests with HPV test, you only need to test every 5 years, unless you have an abnormal result.     You do not need a Pap test if your uterus was removed (hysterectomy) and you have not had cancer.    You should be tested each year for STDs (sexually transmitted diseases) if you're at risk.     Have a mammogram every 1 to 2 years.    Have a colonoscopy at age 50, or have a yearly FIT test (stool test). These exams screen for colon cancer.      Have a cholesterol test every 5 years, or more often if advised.    Have a diabetes test (fasting glucose) every three years. If you are at risk for diabetes, you should have this test more often.     If you are at risk for osteoporosis (brittle bone disease), think about having a bone density scan (DEXA).    Shots: Get a flu shot each year. Get a tetanus shot every 10 years.    Nutrition:      Eat at least 5 servings of fruits and vegetables each day.    Eat whole-grain bread, whole-wheat pasta and brown rice instead of white grains and rice.    Get adequate Calcium and Vitamin D.     Lifestyle    Exercise at least 150 minutes a week (30 minutes a day, 5 days a week). This will help you control your weight and prevent disease.    Limit alcohol to one drink per day.    No smoking.     Wear sunscreen to prevent skin cancer.     See your dentist every six months for an exam and cleaning.    See your eye doctor every 1 to 2 years.            Follow-ups after your visit        Additional Services     GASTROENTEROLOGY ADULT REF CONSULT ONLY       Preferred Location: [facility] [phone]      Please be aware that coverage of these services is subject to the terms and limitations of your health insurance plan.  Call member services at your health plan with any benefit or coverage questions.  Any procedures must be performed at a [facility] facility OR coordinated by your clinic's referral office.    Please bring the following with you to your appointment:    (1) Any X-Rays, CTs or MRIs which have been performed.  Contact the facility where they were done to arrange for  prior to your scheduled appointment.    (2) List of current medications   (3) This referral request   (4) Any documents/labs given to you for this referral            [facility] PT, HAND, AND CHIROPRACTIC REFERRAL       Physical Therapy, Hand Therapy and Chiropractic Care are available through:  *[facility]  *Hand Therapy (Occupational Therapy or Physical Therapy)  *[facility]    Call one number to schedule at any of the above locations: [phone].    Physical therapy, Hand therapy and/or Chiropractic care has been recommended by your physician as an excellent treatment option to reduce pain and help people return to normal activities, including sports.  Therapy and/or chiropractic care services  are a great complement or alternative to expensive and invasive surgery, injections, or long-term use of prescription medications. The primary goal is to identify the underlying problem and provide you the tools to manage your condition on your own.     Please be aware that coverage of these services is subject to the terms and limitations of your health insurance plan.  Call member services at your health plan with any benefit or coverage questions.      Please bring the following to your appointment:  *Your personal calendar for scheduling future appointments  *Comfortable clothing                  Follow-up notes from your care team     Return in about 1 week (around 11/16/2018) for BP Recheck.      Future tests that were ordered for you today     Open Future Orders        Priority Expected Expires Ordered    XR Cervical Spine 2/3 Views Routine 11/9/2018 11/9/2019 11/9/2018    [facility] PT, HAND, AND CHIROPRACTIC REFERRAL Routine  11/9/2019 11/9/2018            Who to contact     If you have questions or need follow up information about today's clinic visit or your schedule please contact [facility] directly at [phone].  Normal or non-critical lab and imaging results will be communicated to you by MyChart, letter or phone within 4 business days after the clinic has received the results. If you do not hear from us within 7 days, please contact the clinic through [employer] or phone. If you have a critical or abnormal lab result, we will notify you by phone as soon as possible.  Submit refill requests through [email or web address] or call your pharmacy and they will forward the refill request to us. Please allow 3 business days for your refill to be completed.          Additional Information About Your Visit        [email or web address] Information     [email or web address] lets you send messages to your doctor, view your test results, renew your prescriptions, schedule appointments and more. To sign up, go to [email or web address] . Click on  "\"Log in\" on the left side of the screen, which will take you to the Welcome page. Then click on \"Sign up Now\" on the right side of the page.     You will be asked to enter the access code listed below, as well as some personal information. Please follow the directions to create your username and password.     Your access code is: [email or web address]  Expires: 2019 10:48 AM     Your access code will  in 90 days. If you need help or a new code, please call your [facility] or [phone].        Care EveryWhere ID     This is your Care EveryWhere ID. This could be used by other organizations to access your [facility] medical records  [id number]        Your Vitals Were     Pulse Temperature Height Pulse Oximetry BMI (Body Mass Index)       76 98.5  F (36.9  C) 5' 4\" (1.626 m) 96% 37.08 kg/m2        Blood Pressure from Last 3 Encounters:   18 152/78   18 138/66   18 164/80    Weight from Last 3 Encounters:   18 216 lb (98 kg)   18 214 lb 6.4 oz (97.3 kg)   18 221 lb (100.2 kg)              We Performed the Following     DEPRESSION ACTION PLAN (DAP)     GASTROENTEROLOGY ADULT REF CONSULT ONLY          Today's Medication Changes          These changes are accurate as of 18 10:48 AM.  If you have any questions, ask your nurse or doctor.               Start taking these medicines.        Dose/Directions    diclofenac 1 % Gel topical gel   Commonly known as:  VOLTAREN   Used for:  Other osteoarthritis involving multiple joints   Started by:  Teagan Ann MD        Apply 4 grams to knees or 2 grams to hands four times daily using enclosed dosing card.   Quantity:  100 g   Refills:  1            Where to get your medicines      These medications were sent to [facility] [address] AT [facility]   [address] [address]     Phone:  [phone]     diclofenac 1 % Gel topical gel                Primary Care Provider " Office Phone # Fax #    Teagan Ann [phone] [address] [place] [address]        Equal Access to Services     MO JORGENSEN : Eloy canchola marcello Schneiderali, wahannada luqadaha, qaybta kaalmada carlton, nabor armijodidier millerpaul peterson lasusiedidier mariscal. So [facility] [phone].    ATENCIÓN: Si habla español, tiene a wyatt disposición servicios gratuitos de asistencia lingüística. Llame al [phone].    We comply with applicable federal civil rights laws and [place] laws. We do not discriminate on the basis of race, color, national origin, age, disability, sex, sexual orientation, or gender identity.            Thank you!     Thank you for choosing [facility]  for your care. Our goal is always to provide you with excellent care. Hearing back from our patients is one way we can continue to improve our services. Please take a few minutes to complete the written survey that you may receive in the mail after your visit with us. Thank you!             Your Updated Medication List - Protect others around you: Learn how to safely use, store and throw away your medicines at www.disposemymeds.org.          This list is accurate as of 11/9/18 10:48 AM.  Always use your most recent med list.                   Brand Name Dispense Instructions for use Diagnosis    cloNIDine 0.2 MG tablet    CATAPRES    180 tablet    Take 2 tablets (0.4 mg) by mouth At Bedtime    Generalized hyperhidrosis       diclofenac 1 % Gel topical gel    VOLTAREN    100 g    Apply 4 grams to knees or 2 grams to hands four times daily using enclosed dosing card.    Other osteoarthritis involving multiple joints       fluticasone 27.5 MCG/SPRAY spray    VERAMYST    10 g    Spray 1-2 sprays into both nostrils daily    Non-allergic rhinitis       fluticasone 50 MCG/ACT spray    FLONASE    1 Bottle    Spray 1-2 sprays into both nostrils daily    Chronic vasomotor rhinitis       lisinopril 40 MG tablet     PRINIVIL/ZESTRIL    30 tablet    Take 1 tablet (40 mg) by mouth daily    Benign essential hypertension       lisinopril-hydrochlorothiazide 20-12.5 MG per tablet    PRINZIDE/ZESTORETIC    60 tablet    Take 2 tablets by mouth daily    Benign essential hypertension       PARoxetine 40 MG tablet    PAXIL    90 tablet    TAKE 1 TABLET(40 MG) BY MOUTH DAILY    Major depressive disorder, recurrent episode, moderate (H)       * venlafaxine 75 MG 24 hr capsule    EFFEXOR-XR    90 capsule    Take 1 capsule (75 mg) by mouth daily    Major depressive disorder, recurrent episode, moderate (H)       * venlafaxine 37.5 MG Tb24 24 hr tablet    EFFEXOR-ER    180 tablet    TAKE 2 TABLETS(75 MG) BY MOUTH DAILY    Major depressive disorder, recurrent episode, moderate (H)       VITAMIN C PO           VITAMIN D (CHOLECALCIFEROL) PO      Take by mouth daily        * Notice:  This list has 2 medication(s) that are the same as other medications prescribed for you. Read the directions carefully, and ask your doctor or other care provider to review them with you.

## 2018-11-09 NOTE — PROGRESS NOTES
"   SUBJECTIVE:   CC: Lilia Lilly is an 63 year old woman who presents for preventive health visit.     Physical   Annual:     Getting at least 3 servings of Calcium per day:  Yes    Bi-annual eye exam:  Yes    Dental care twice a year:  Yes    Sleep apnea or symptoms of sleep apnea:  Sleep apnea    Diet:  Low salt    Frequency of exercise:  1 day/week    Duration of exercise:  15-30 minutes    Taking medications regularly:  Yes    Medication side effects:  None    Other concerns:    1. Chronic stomach pains with nausea.  Patient wondering if her \"colitis\" is acting up.  She thinks she was seen previously by [facility], but it has been many years.  She was previously told she might have a fissure as well.  No bloody stools today.    2. Neck and arm pain - patient works at [employer] and feels this posture is contributing to her pains.  Shooting pains from neck down to hands, usually feels better after stretching and moving. Also a lot of pain at the base of both thumbs.    Today's PHQ-2 Score:   PHQ-2 ( 1999 [employer]) 11/9/2018   Q1: Little interest or pleasure in doing things 2   Q2: Feeling down, depressed or hopeless 1   PHQ-2 Score 3   Q1: Little interest or pleasure in doing things More than half the days   Q2: Feeling down, depressed or hopeless Several days   PHQ-2 Score 3       Abuse: Current or Past(Physical, Sexual or Emotional)- No  Do you feel safe in your environment - Yes    Social History   Substance Use Topics     Smoking status: Former Smoker     Types: Cigarettes     Quit date: 7/21/2017     Smokeless tobacco: Never Used     Alcohol use Yes      Comment: social drinker     Alcohol Use 11/9/2018   If you drink alcohol do you typically have greater than 3 drinks per day OR greater than 7 drinks per week? No       Reviewed orders with patient.  Reviewed health maintenance and updated orders accordingly - Yes  BP Readings from Last 3 Encounters:   11/09/18 152/78   08/30/18 138/66   08/13/18 164/80    Wt " "Readings from Last 3 Encounters:   11/09/18 216 lb (98 kg)   08/30/18 214 lb 6.4 oz (97.3 kg)   07/23/18 221 lb (100.2 kg)                    Patient over age 50, mutual decision to screen reflected in health maintenance.    Pertinent mammograms are reviewed under the imaging tab.  History of abnormal Pap smear: NO - age 30-65 PAP every 5 years with negative HPV co-testing recommended     Reviewed and updated as needed this visit by clinical staff  Tobacco  Allergies  Med Hx  Surg Hx  Fam Hx  Soc Hx        Reviewed and updated as needed this visit by Provider         Review of Systems   Constitutional: Negative for chills.   HENT: Negative for congestion and ear pain.    Eyes: Negative for pain.   Respiratory: Positive for cough.    Cardiovascular: Negative for chest pain.   Gastrointestinal: Positive for abdominal pain and diarrhea. Negative for constipation and hematochezia.   Genitourinary: Negative for hematuria.   Neurological: Negative for dizziness.           OBJECTIVE:   /78 (BP Location: Right arm, Cuff Size: Adult Large)  Pulse 76  Temp 98.5  F (36.9  C)  Ht 5' 4\" (1.626 m)  Wt 216 lb (98 kg)  SpO2 96%  BMI 37.08 kg/m2  Physical Exam  GENERAL: healthy, alert and no distress  EYES: Eyes grossly normal to inspection, PERRL and conjunctivae and sclerae normal  HENT: ear canals and TM's normal, nose and mouth without ulcers or lesions  NECK: no adenopathy, no asymmetry, masses, or scars and thyroid normal to palpation  RESP: lungs clear to auscultation - no rales, rhonchi or wheezes  BREAST: normal without masses, tenderness or nipple discharge and no palpable axillary masses or adenopathy  CV: regular rate and rhythm, normal S1 S2, no S3 or S4, no murmur, click or rub, no peripheral edema and peripheral pulses strong  ABDOMEN: soft, nontender, no hepatosplenomegaly, no masses and bowel sounds normal  MS: no gross musculoskeletal defects noted, no edema  SKIN: no suspicious lesions or " "rashes  NEURO: Normal strength and tone, mentation intact and speech normal  PSYCH: mentation appears normal, affect normal/bright    Diagnostic Test Results:  none     ASSESSMENT/PLAN:   1. Encounter for general adult medical examination with abnormal findings      2. Benign essential hypertension  Uncontrolled - per interview patient has only been taking one of her prinzide - she will double check dose on bottle and make sure pharmacy didn't prescribe only one pill of higher dose - otherwise she will double as previously prescribed and follow up with nurse only appt.     3. Ulcerative pancolitis with complication (H)  rec follow up with GI  - GASTROENTEROLOGY ADULT REF CONSULT ONLY    4. Other osteoarthritis involving multiple joints  - diclofenac (VOLTAREN) 1 % GEL topical gel; Apply 4 grams to knees or 2 grams to hands four times daily using enclosed dosing card.  Dispense: 100 g; Refill: 1  - OFFICE/OUTPT VISIT,EST,LEVL III    5. Neck pain  Check from compression - may need to se ortho in the future.  - XR Cervical Spine 2/3 Views; Future  - OFFICE/OUTPT VISIT,EST,LEVL III    6. Bilateral shoulder pain, unspecified chronicity  Start with physical therapy with neck and shoulder pain - as above, may need to see ortho in the future  - [facility] PT, HAND, AND CHIROPRACTIC REFERRAL; Future  - OFFICE/OUTPT VISIT,EST,LEVL III    7. Screening for cardiovascular condition  - Lipid Profile (Chol, Trig, HDL, LDL calc)    8. Screening for diabetes mellitus  - Glucose    COUNSELING:  Reviewed preventive health counseling, as reflected in patient instructions    BP Readings from Last 1 Encounters:   11/09/18 152/78     Estimated body mass index is 37.08 kg/(m^2) as calculated from the following:    Height as of this encounter: 5' 4\" (1.626 m).    Weight as of this encounter: 216 lb (98 kg).      Weight management plan: Discussed healthy diet and exercise guidelines and patient will follow up in 12 months in clinic to " re-evaluate.     reports that she quit smoking about 15 months ago. Her smoking use included Cigarettes. She has never used smokeless tobacco.      Counseling Resources:  ATP IV Guidelines  Pooled Cohorts Equation Calculator  Breast Cancer Risk Calculator  FRAX Risk Assessment  ICSI Preventive Guidelines  Dietary Guidelines for Americans, 2010  USDA's MyPlate  ASA Prophylaxis  Lung CA Screening    Teagan Ann MD  [facility]  Answers for HPI/ROS submitted by the patient on 11/9/2018   PHQ-2 Score: 3  If you checked off any problems, how difficult have these problems made it for you to do your work, take care of things at home, or get along with other people?: Somewhat difficult  PHQ9 TOTAL SCORE: 8

## 2018-11-10 ASSESSMENT — PATIENT HEALTH QUESTIONNAIRE - PHQ9: SUM OF ALL RESPONSES TO PHQ QUESTIONS 1-9: 8

## 2018-11-11 DIAGNOSIS — I10 BENIGN ESSENTIAL HYPERTENSION: ICD-10-CM

## 2018-11-11 NOTE — TELEPHONE ENCOUNTER
"Requested Prescriptions   Pending Prescriptions Disp Refills     lisinopril-hydrochlorothiazide (PRINZIDE/ZESTORETIC) 20-12.5 MG per tablet [Pharmacy Med Name: LISINOPRIL-HCTZ 20/12.5MG TABLETS]  Last Written Prescription Date:  08/13/2018  Last Fill Quantity: 60,  # refills: 1   Last office visit: 11/9/2018 with prescribing provider:   ETHAN  Future Office Visit:   Next 5 appointments (look out 90 days)     Nov 20, 2018  3:00 PM CST   Nurse Only with EA NURSE AB   [facility]    [address]  [address]  [facility] [place] [address]   [phone]                  60 tablet 0     Sig: TAKE 2 TABLETS BY MOUTH DAILY    Diuretics (Including Combos) Protocol Failed    11/11/2018  3:29 AM       Failed - Blood pressure under 140/90 in past 12 months    BP Readings from Last 3 Encounters:   11/09/18 152/78   08/30/18 138/66   08/13/18 164/80                Passed - Recent (12 mo) or future (30 days) visit within the authorizing provider's specialty    Patient had office visit in the last 12 months or has a visit in the next 30 days with authorizing provider or within the authorizing provider's specialty.  See \"Patient Info\" tab in inbasket, or \"Choose Columns\" in Meds & Orders section of the refill encounter.             Passed - Patient is age 18 or older       Passed - No active pregancy on record       Passed - Normal serum creatinine on file in past 12 months    Recent Labs   Lab Test  08/30/18   1444   CR  0.84             Passed - Normal serum potassium on file in past 12 months    Recent Labs   Lab Test  08/30/18   1444   POTASSIUM  4.0                   Passed - Normal serum sodium on file in past 12 months    Recent Labs   Lab Test  08/30/18   1444   NA  139             Passed - No positive pregnancy test in past 12 months          "

## 2018-11-14 RX ORDER — LISINOPRIL AND HYDROCHLOROTHIAZIDE 12.5; 2 MG/1; MG/1
TABLET ORAL
Qty: 60 TABLET | Refills: 0 | Status: SHIPPED | OUTPATIENT
Start: 2018-11-14 | End: 2018-12-26

## 2018-11-14 NOTE — TELEPHONE ENCOUNTER
Routing refill request to provider for review/approval because:  Labs out of range:  BP elevated.  Due for nurse only check next week  Carli Ann RN - Triage  [facility]

## 2018-11-15 ENCOUNTER — THERAPY VISIT (OUTPATIENT)
Dept: PHYSICAL THERAPY | Facility: CLINIC | Age: 63
End: 2018-11-15
Attending: PEDIATRICS
Payer: COMMERCIAL

## 2018-11-15 DIAGNOSIS — M25.511 SHOULDER PAIN, RIGHT: ICD-10-CM

## 2018-11-15 DIAGNOSIS — M25.511 BILATERAL SHOULDER PAIN, UNSPECIFIED CHRONICITY: ICD-10-CM

## 2018-11-15 DIAGNOSIS — M25.512 BILATERAL SHOULDER PAIN, UNSPECIFIED CHRONICITY: ICD-10-CM

## 2018-11-15 DIAGNOSIS — M54.2 CERVICAL PAIN: ICD-10-CM

## 2018-11-15 DIAGNOSIS — M25.512 SHOULDER PAIN, LEFT: ICD-10-CM

## 2018-11-15 PROCEDURE — 97110 THERAPEUTIC EXERCISES: CPT | Mod: GP | Performed by: PHYSICAL THERAPIST

## 2018-11-15 PROCEDURE — 97161 PT EVAL LOW COMPLEX 20 MIN: CPT | Mod: GP | Performed by: PHYSICAL THERAPIST

## 2018-11-15 NOTE — PROGRESS NOTES
[facility] Initial Evaluation  Subjective:  Patient is a 63 year old female presenting with rehab left shoulder hpi. The history is provided by the patient. No  was used.   Lilia Lilly is a 63 year old female with a bilateral shoulders (neck pain) condition.  Condition occurred with:  Unknown cause.  Condition occurred: for unknown reasons.  This is a chronic condition  (MD order 11/9/2018)The patient is reporting insidious onset B anterior shoulder pain that radiates to the upper arms. Does have history of L rotator cuff injury 10+ years ago. Reports this pain feels similar. In addition, complains of pain in the lower neck to shoulder blades, feels the muscles are tight. Feels the shoulder pain increases while brushing her hair, reaching behind her back, reaching at shoulder level. The shoulders feel better when arm is fully extended overhead and when crossed in front of body. Neck pain increases while looking down. 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    Patient reports pain:  Anterior.  Radiates to:  Upper arm.  Pain is described as aching and is constant and reported as 5/10.  Associated symptoms:  Loss of motion/stiffness, numbness, painful arc and loss of strength (numbness in hands only). Pain is the same all the time.  Symptoms are exacerbated by other, using arm at shoulder level, using arm behind back, lying on extremity, certain positions, carrying and lifting (brushing hair) and relieved by other (certain positions).  Since onset symptoms are gradually worsening.  Special tests:  X-ray.      General health as reported by patient is fair.  Pertinent medical history includes:  Depression, fibromyalgia, high blood pressure, numbness/tingling, overweight and sleep disorder/apnea.  Medical allergies: no.  Other surgeries include:  Orthopedic surgery (hand surgery B x 2 each).  Current medications:  Anti-depressants and high blood pressure  medication.  Current occupation is small part assembly .  Patient is working in normal job without restrictions.  Primary job tasks include:  Prolonged sitting, repetitive tasks and other (computer).    Barriers include:  None as reported by the patient.    Red flags:  None as reported by the patient.                        Objective:  Standing Alignment:    Cervical/Thoracic:  Forward head and Dowager's hump (poor sitting posture)  Shoulder/UE:  Rounded shoulders  Lumbar:  Lordosis decr                Flexibility/Screens:     Upper Extremity:    Decreased left upper extremity flexibility at:  Pectoralis Major and Pectoralis Minor    Decreased right upper extremity flexibility present at:  Pectoralis Major and Pectoralis Minor                      Cervical/Thoracic Evaluation  Arom wnl cervical: Repeated Cervical Retraction: improves ROM, pt reports relief.     AROM:  AROM Cervical:    Flexion:          100%  Extension:       50% +  Rotation:         Left: 75%+     Right: 75%+  Side Bend:      Left:     Right:         Cervical Myotomes:        C4 (shrug):  Left: 5    Right: 5  C5 (Deltoid):  Left: 4+    Right: 4+  C6 (Biceps):  Left: 5    Right: 5  C7 (Triceps):  Left: 5    Right: 5  C8 (Thumb Ext): Left: 5    Right: 5  T1 (Intrinsics): Left: 5    Right: 5        Cervical Palpation:    Tenderness present at Left:    Upper Trap; Levator and Erector Spinae  Tenderness present at Right:    Upper Trap; Levator and Erector Spinae               Shoulder Evaluation:  ROM:  AROM:    Flexion:  Left:  180 ++    Right:  180 ++  Extension: Left: 58Right: 55  Abduction:  Left: 180 +   Right:  180 +                Flexion/External Rotation:  Left:  T3    Right:  T3  Extension/Internal Rotation:  Left:  Lateral L5    Right:  Lateral L5          Strength:    Flexion: Left:4+/5    Pain: +    Right: 4+/5      Pain:  +  Extension:  Left: 4+/5    Pain:    Right: 5/5    Pain:  Abduction:  Left: 4+/5   Pain:+    Right: 4+/5       Pain:+    Internal Rotation:  Left:5/5     Pain:    Right: 5/5     Pain:  External Rotation:   Left:5-/5     Pain:   Right:5-/5     Pain:              Special Tests:    Left shoulder positive for the following special tests:  Impingement  Left shoulder negative for the following special tests:  Rotator cuff tear  Right shoulder positive for the following special tests:Impingement  Right shoulder negative for the following special tests:Rotator cuff tear  Palpation:    Left shoulder tenderness present at:  Supraspinatus and Bicipital Groove  Right shoulder tenderness present at: Supraspinatus and Bicipital Groove                                     General     ROS    Assessment/Plan:    Patient is a 63 year old female with cervical and both sides shoulder complaints.    Patient has the following significant findings with corresponding treatment plan.                Diagnosis 1:  B shoulder pain  Pain -  hot/cold therapy, manual therapy, education and home program  Decreased ROM/flexibility - manual therapy and therapeutic exercise  Decreased strength - therapeutic exercise and therapeutic activities  Impaired muscle performance - neuro re-education  Decreased function - therapeutic activities  Impaired posture - neuro re-education  Diagnosis 2:  Neck pain   Pain -  hot/cold therapy, manual therapy, education, directional preference exercise and home program  Decreased ROM/flexibility - manual therapy and therapeutic exercise  Impaired muscle performance - neuro re-education  Decreased function - therapeutic activities  Impaired posture - neuro re-education    Therapy Evaluation Codes:   1) History comprised of:   Personal factors that impact the plan of care:      Age, Overall behavior pattern, Profession and Time since onset of symptoms.    Comorbidity factors that impact the plan of care are:      Depression, Fibromyalgia, High blood pressure, Numbness/tingling, Overweight and Sleep disorder/apnea.     Medications  impacting care: Anti-depressant and High blood pressure.  2) Examination of Body Systems comprised of:   Body structures and functions that impact the plan of care:      Cervical spine and Shoulder.   Activity limitations that impact the plan of care are:      Bathing, Cooking, Dressing, Lifting and Reading/Computer work.  3) Clinical presentation characteristics are:   Stable/Uncomplicated.  4) Decision-Making    Low complexity using standardized patient assessment instrument and/or measureable assessment of functional outcome.  Cumulative Therapy Evaluation is: Low complexity.    Previous and current functional limitations:  (See Goal Flow Sheet for this information)    Short term and Long term goals: (See Goal Flow Sheet for this information)     Communication ability:  Patient appears to be able to clearly communicate and understand verbal and written communication and follow directions correctly.  Treatment Explanation - The following has been discussed with the patient:   RX ordered/plan of care  Anticipated outcomes  Possible risks and side effects  This patient would benefit from PT intervention to resume normal activities.   Rehab potential is good.    Frequency:  1 X week, once daily  Duration:  for 8 weeks  Discharge Plan:  Achieve all LTG.  Independent in home treatment program.  Reach maximal therapeutic benefit.    Please refer to the daily flowsheet for treatment today, total treatment time and time spent performing 1:1 timed codes.

## 2018-11-20 ENCOUNTER — ALLIED HEALTH/NURSE VISIT (OUTPATIENT)
Dept: NURSING | Facility: CLINIC | Age: 63
End: 2018-11-20
Payer: COMMERCIAL

## 2018-11-20 VITALS — DIASTOLIC BLOOD PRESSURE: 64 MMHG | HEART RATE: 76 BPM | SYSTOLIC BLOOD PRESSURE: 122 MMHG

## 2018-11-20 DIAGNOSIS — I10 BENIGN ESSENTIAL HYPERTENSION: Primary | ICD-10-CM

## 2018-11-20 PROCEDURE — 99207 ZZC NO CHARGE NURSE ONLY: CPT

## 2018-11-20 NOTE — MR AVS SNAPSHOT
"              After Visit Summary   11/20/2018    Lilia Lilly    MRN: [id number]           Patient Information     Date Of Birth          1955        Visit Information        Provider Department      11/20/2018 3:00 PM [facility]        Today's Diagnoses     Benign essential hypertension    -  1       Follow-ups after your visit        Your next 10 appointments already scheduled     Nov 28, 2018  3:50 PM CST   [facility] Extremity with uLz Christy PT   [facility] [place] ([facility] [place]  )    [address]  [address]  [place] [address]   [phone]              Who to contact     If you have questions or need follow up information about today's clinic visit or your schedule please contact [facility] directly at [phone].  Normal or non-critical lab and imaging results will be communicated to you by MyChart, letter or phone within 4 business days after the clinic has received the results. If you do not hear from us within 7 days, please contact the clinic through MyChart or phone. If you have a critical or abnormal lab result, we will notify you by phone as soon as possible.  Submit refill requests through [employer] or call your pharmacy and they will forward the refill request to us. Please allow 3 business days for your refill to be completed.          Additional Information About Your Visit        [facility] Information     [employer] lets you send messages to your doctor, view your test results, renew your prescriptions, schedule appointments and more. To sign up, go to [email or web address] . Click on \"Log in\" on the left side of the screen, which will take you to the Welcome page. Then click on \"Sign up Now\" on the right side of the page.     You will be asked to enter the access code listed below, as well as some personal information. Please follow the directions to create your username and password.     Your access code is: " [email or web address]  Expires: 2019 10:48 AM     Your access code will  in 90 days. If you need help or a new code, please call your [facility] clinic or [phone].        Care EveryWhere ID     This is your Care EveryWhere ID. This could be used by other organizations to access your [facility] medical records  [id number]        Your Vitals Were     Pulse                   76            Blood Pressure from Last 3 Encounters:   18 122/64   18 152/78   18 138/66    Weight from Last 3 Encounters:   18 216 lb (98 kg)   18 214 lb 6.4 oz (97.3 kg)   18 221 lb (100.2 kg)              Today, you had the following     No orders found for display       Primary Care Provider Office Phone # Fax #    Teagan Ann [phone] [address] [place] [address]        Equal Access to Services     [facility]: Hadii aad ku hadasho Soomaali, waaxda luqadaha, qaybta kaalmada adeegyada, waxay idiin hayaan angelaeg kharadiego la'ramseyn . So [facility] [phone].    ATENCIÓN: Si habla español, tiene a wyatt disposición servicios gratuitos de asistencia lingüística. [facility] [phone].    We comply with applicable federal civil rights laws and [place] laws. We do not discriminate on the basis of race, color, national origin, age, disability, sex, sexual orientation, or gender identity.            Thank you!     Thank you for choosing [facility]3] [facility]  for your care. Our goal is always to provide you with excellent care. Hearing back from our patients is one way we can continue to improve our services. Please take a few minutes to complete the written survey that you may receive in the mail after your visit with us. Thank you!             Your Updated Medication List - Protect others around you: Learn how to safely use, store and throw away your medicines at www.disposemymeds.org.          This list is accurate as of 18 11:59 PM.  Always use your most  recent med list.                   Brand Name Dispense Instructions for use Diagnosis    cloNIDine 0.2 MG tablet    CATAPRES    180 tablet    Take 2 tablets (0.4 mg) by mouth At Bedtime    Generalized hyperhidrosis       diclofenac 1 % topical gel    VOLTAREN    100 g    Apply 4 grams to knees or 2 grams to hands four times daily using enclosed dosing card.    Other osteoarthritis involving multiple joints       fluticasone 27.5 MCG/SPRAY spray    VERAMYST    10 g    Spray 1-2 sprays into both nostrils daily    Non-allergic rhinitis       fluticasone 50 MCG/ACT spray    FLONASE    1 Bottle    Spray 1-2 sprays into both nostrils daily    Chronic vasomotor rhinitis       lisinopril 40 MG tablet    PRINIVIL/ZESTRIL    30 tablet    Take 1 tablet (40 mg) by mouth daily    Benign essential hypertension       lisinopril-hydrochlorothiazide 20-12.5 MG per tablet    PRINZIDE/ZESTORETIC    60 tablet    TAKE 2 TABLETS BY MOUTH DAILY    Benign essential hypertension       PARoxetine 40 MG tablet    PAXIL    90 tablet    TAKE 1 TABLET(40 MG) BY MOUTH DAILY    Major depressive disorder, recurrent episode, moderate (H)       * venlafaxine 75 MG 24 hr capsule    EFFEXOR-XR    90 capsule    Take 1 capsule (75 mg) by mouth daily    Major depressive disorder, recurrent episode, moderate (H)       * venlafaxine 37.5 MG Tb24 24 hr tablet    EFFEXOR-ER    180 tablet    TAKE 2 TABLETS(75 MG) BY MOUTH DAILY    Major depressive disorder, recurrent episode, moderate (H)       VITAMIN C PO           VITAMIN D (CHOLECALCIFEROL) PO      Take by mouth daily        * Notice:  This list has 2 medication(s) that are the same as other medications prescribed for you. Read the directions carefully, and ask your doctor or other care provider to review them with you.

## 2018-11-20 NOTE — PROGRESS NOTES
Lilia Lilly is a 63 year old patient who comes in today for a Blood Pressure check.  Initial BP:  There were no vitals taken for this visit.     Data Unavailable  Disposition: results routed to provider.  Anastasia ROD CMA,JOSSE

## 2018-11-21 NOTE — PROGRESS NOTES
Called and let her know BP looked great and to continue on her same medications.    Kenia Arora MA

## 2018-11-21 NOTE — PROGRESS NOTES
Please let patient know her BP looked great!  Continue her current medications.    Teagan Ann MD  Internal Medicine/Pediatrics  [facility]

## 2018-12-07 ENCOUNTER — TRANSFERRED RECORDS (OUTPATIENT)
Dept: HEALTH INFORMATION MANAGEMENT | Facility: CLINIC | Age: 63
End: 2018-12-07

## 2018-12-11 ENCOUNTER — TRANSFERRED RECORDS (OUTPATIENT)
Dept: HEALTH INFORMATION MANAGEMENT | Facility: CLINIC | Age: 63
End: 2018-12-11

## 2018-12-26 DIAGNOSIS — I10 BENIGN ESSENTIAL HYPERTENSION: ICD-10-CM

## 2018-12-26 RX ORDER — LISINOPRIL AND HYDROCHLOROTHIAZIDE 12.5; 2 MG/1; MG/1
TABLET ORAL
Qty: 180 TABLET | Refills: 2 | Status: SHIPPED | OUTPATIENT
Start: 2018-12-26 | End: 2019-08-08

## 2018-12-26 NOTE — TELEPHONE ENCOUNTER
"Requested Prescriptions   Pending Prescriptions Disp Refills     lisinopril-hydrochlorothiazide (PRINZIDE/ZESTORETIC) 20-12.5 MG tablet [Pharmacy Med Name: LISINOPRIL-HCTZ 20/12.5MG TABLETS]    Last Written Prescription Date:  11/14/2018  Last Fill Quantity: 60,  # refills: 0   Last office visit: 11/9/2018 with prescribing provider:  Teagan Ann     Future Office Visit:     60 tablet 0     Sig: TAKE 2 TABLETS BY MOUTH DAILY    Diuretics (Including Combos) Protocol Passed - 12/26/2018  3:29 AM       Passed - Blood pressure under 140/90 in past 12 months    BP Readings from Last 3 Encounters:   11/20/18 122/64   11/09/18 152/78   08/30/18 138/66                Passed - Recent (12 mo) or future (30 days) visit within the authorizing provider's specialty    Patient had office visit in the last 12 months or has a visit in the next 30 days with authorizing provider or within the authorizing provider's specialty.  See \"Patient Info\" tab in inbasket, or \"Choose Columns\" in Meds & Orders section of the refill encounter.             Passed - Patient is age 18 or older       Passed - No active pregancy on record       Passed - Normal serum creatinine on file in past 12 months    Recent Labs   Lab Test 08/30/18  1444   CR 0.84             Passed - Normal serum potassium on file in past 12 months    Recent Labs   Lab Test 08/30/18  1444   POTASSIUM 4.0                   Passed - Normal serum sodium on file in past 12 months    Recent Labs   Lab Test 08/30/18  1444                Passed - No positive pregnancy test in past 12 months          "

## 2018-12-26 NOTE — TELEPHONE ENCOUNTER
"Per 11/20 per PCP: \"Please let patient know her BP looked great!  Continue her current medications.\"    Prescription approved per [facility] Refill Protocol.  Sol Montes RN  Message handled by Nurse Triage.    "

## 2018-12-31 ENCOUNTER — TRANSFERRED RECORDS (OUTPATIENT)
Dept: HEALTH INFORMATION MANAGEMENT | Facility: CLINIC | Age: 63
End: 2018-12-31

## 2019-01-18 ENCOUNTER — TRANSFERRED RECORDS (OUTPATIENT)
Dept: HEALTH INFORMATION MANAGEMENT | Facility: CLINIC | Age: 64
End: 2019-01-18

## 2019-01-23 ENCOUNTER — TRANSFERRED RECORDS (OUTPATIENT)
Dept: HEALTH INFORMATION MANAGEMENT | Facility: CLINIC | Age: 64
End: 2019-01-23

## 2019-02-13 PROBLEM — M25.511 SHOULDER PAIN, RIGHT: Status: RESOLVED | Noted: 2018-11-15 | Resolved: 2019-02-13

## 2019-02-13 PROBLEM — M25.512 SHOULDER PAIN, LEFT: Status: RESOLVED | Noted: 2018-11-15 | Resolved: 2019-02-13

## 2019-02-13 PROBLEM — M54.2 CERVICAL PAIN: Status: RESOLVED | Noted: 2018-11-15 | Resolved: 2019-02-13

## 2019-03-13 ENCOUNTER — TRANSFERRED RECORDS (OUTPATIENT)
Dept: HEALTH INFORMATION MANAGEMENT | Facility: CLINIC | Age: 64
End: 2019-03-13

## 2019-05-23 ENCOUNTER — TRANSFERRED RECORDS (OUTPATIENT)
Dept: HEALTH INFORMATION MANAGEMENT | Facility: CLINIC | Age: 64
End: 2019-05-23

## 2019-06-01 DIAGNOSIS — F33.1 MAJOR DEPRESSIVE DISORDER, RECURRENT EPISODE, MODERATE (H): ICD-10-CM

## 2019-06-01 NOTE — TELEPHONE ENCOUNTER
"Requested Prescriptions   Pending Prescriptions Disp Refills     venlafaxine (EFFEXOR-ER) 37.5 MG 24 hr tablet [Pharmacy Med Name: VENLAFAXINE ER 37.5MG TABLETS]  Last Written Prescription Date:  07/25/2018  Last Fill Quantity: 90 capsule,  # refills: 1    Last Office Visit: 11/9/2018 Teagan Ann MD       Future Office Visit:      180 tablet 0     Sig: TAKE 2 TABLETS BY MOUTH EVERY DAY.       Serotonin-Norepinephrine Reuptake Inhibitors  Failed - 6/1/2019 11:36 AM        Failed - PHQ-9 score of less than 5 in past 6 months     Please review last PHQ-9 score.   PHQ-9 SCORE 8/7/2017 7/27/2018 11/9/2018   PHQ-9 Total Score MyChart - - 8 (Mild depression)   PHQ-9 Total Score 13 10 8     BING-7 SCORE 7/27/2018   Total Score 8             Failed - Recent (6 mo) or future (30 days) visit within the authorizing provider's specialty     Patient had office visit in the last 6 months or has a visit in the next 30 days with authorizing provider or within the authorizing provider's specialty.  See \"Patient Info\" tab in inbasket, or \"Choose Columns\" in Meds & Orders section of the refill encounter.            Passed - Blood pressure under 140/90 in past 12 months     BP Readings from Last 3 Encounters:   11/20/18 122/64   11/09/18 152/78   08/30/18 138/66                 Passed - Medication is active on med list        Passed - Patient is age 18 or older        Passed - No active pregnancy on record        Passed - Normal serum creatinine on file in past 12 months     Recent Labs   Lab Test 08/30/18  1444   CR 0.84             Passed - No positive pregnancy test in past 12 months          "

## 2019-06-04 NOTE — TELEPHONE ENCOUNTER
Effexor  Routing refill request to provider for review/approval because:  A break in medication    Claribel Segal, RN, BSN

## 2019-06-05 DIAGNOSIS — F33.1 MAJOR DEPRESSIVE DISORDER, RECURRENT EPISODE, MODERATE (H): ICD-10-CM

## 2019-06-05 RX ORDER — VENLAFAXINE HYDROCHLORIDE 37.5 MG/1
TABLET, EXTENDED RELEASE ORAL
Qty: 60 TABLET | Refills: 0 | Status: SHIPPED | OUTPATIENT
Start: 2019-06-05 | End: 2019-08-16

## 2019-06-05 RX ORDER — VENLAFAXINE HYDROCHLORIDE 37.5 MG/1
TABLET, EXTENDED RELEASE ORAL
Qty: 180 TABLET | Refills: 0 | OUTPATIENT
Start: 2019-06-05

## 2019-06-05 NOTE — TELEPHONE ENCOUNTER
One month sent - patient needs appt before further refills- had break in medication and due anyway for 6 month follow up of depression.    Teagan Ann MD  Internal Medicine/Pediatrics  [facility]

## 2019-06-05 NOTE — TELEPHONE ENCOUNTER
Duplicate.     venlafaxine (EFFEXOR-ER) 37.5 MG 24 hr tablet was filled on 6/5/2019, qty 60 with 0 refills.     Radha Shoemaker RN

## 2019-06-05 NOTE — TELEPHONE ENCOUNTER
Called pt and said she does not need this medication filled. She has enough and does not want it filled. She will call back to make a wellness visit.

## 2019-06-21 ENCOUNTER — OFFICE VISIT (OUTPATIENT)
Dept: PEDIATRICS | Facility: CLINIC | Age: 64
End: 2019-06-21
Payer: COMMERCIAL

## 2019-06-21 VITALS
HEART RATE: 78 BPM | HEIGHT: 64 IN | TEMPERATURE: 98.4 F | DIASTOLIC BLOOD PRESSURE: 62 MMHG | OXYGEN SATURATION: 97 % | WEIGHT: 212 LBS | SYSTOLIC BLOOD PRESSURE: 124 MMHG | BODY MASS INDEX: 36.19 KG/M2

## 2019-06-21 DIAGNOSIS — Z13.6 SCREENING FOR CARDIOVASCULAR CONDITION: ICD-10-CM

## 2019-06-21 DIAGNOSIS — K51.019 ULCERATIVE PANCOLITIS WITH COMPLICATION (H): ICD-10-CM

## 2019-06-21 DIAGNOSIS — F33.1 MAJOR DEPRESSIVE DISORDER, RECURRENT EPISODE, MODERATE (H): Primary | ICD-10-CM

## 2019-06-21 DIAGNOSIS — I10 BENIGN ESSENTIAL HYPERTENSION: ICD-10-CM

## 2019-06-21 DIAGNOSIS — R52 GENERALIZED BODY ACHES: ICD-10-CM

## 2019-06-21 PROCEDURE — 99214 OFFICE O/P EST MOD 30 MIN: CPT | Performed by: PEDIATRICS

## 2019-06-21 RX ORDER — MONTELUKAST SODIUM 4 MG/1
TABLET, CHEWABLE ORAL
COMMUNITY
Start: 2019-06-01

## 2019-06-21 RX ORDER — DULOXETIN HYDROCHLORIDE 60 MG/1
60 CAPSULE, DELAYED RELEASE ORAL DAILY
Qty: 90 CAPSULE | Refills: 0 | Status: SHIPPED | OUTPATIENT
Start: 2019-06-21 | End: 2019-08-08

## 2019-06-21 ASSESSMENT — ANXIETY QUESTIONNAIRES
3. WORRYING TOO MUCH ABOUT DIFFERENT THINGS: NEARLY EVERY DAY
6. BECOMING EASILY ANNOYED OR IRRITABLE: NEARLY EVERY DAY
2. NOT BEING ABLE TO STOP OR CONTROL WORRYING: NEARLY EVERY DAY
5. BEING SO RESTLESS THAT IT IS HARD TO SIT STILL: NOT AT ALL

## 2019-06-21 ASSESSMENT — PATIENT HEALTH QUESTIONNAIRE - PHQ9
5. POOR APPETITE OR OVEREATING: NOT AT ALL
SUM OF ALL RESPONSES TO PHQ QUESTIONS 1-9: 13

## 2019-06-21 ASSESSMENT — MIFFLIN-ST. JEOR: SCORE: 1491.63

## 2019-07-24 NOTE — PROGRESS NOTES
"Subjective     Lilia Lilly is a 64 year old female who presents to clinic today for the following health issues:    HPI   Depression and Anxiety Follow-Up    How are you doing with your depression since your last visit? Worsened - she is currently taking effexor 75mg daily - does not feel this is working and also can't afford it ($400/m).  She feels very irritable and crabby - saying things she regrets    How are you doing with your anxiety since your last visit?  Worsened     Are you having other symptoms that might be associated with depression or anxiety? No    Have you had a significant life event? Relationship Concerns, Job Concerns and Financial Concerns     Do you have any concerns with your use of alcohol or other drugs? No    Social History     Tobacco Use     Smoking status: Former Smoker     Types: Cigarettes     Last attempt to quit: 2017     Years since quittin.9     Smokeless tobacco: Never Used   Substance Use Topics     Alcohol use: Yes     Comment: social drinker     Drug use: No     PHQ 2017   PHQ-9 Total Score 13 10 8   Q9: Thoughts of better off dead/self-harm past 2 weeks Not at all Not at all Not at all     BING-7 SCORE 2018   Total Score 8       Suicide Assessment Five-step Evaluation and Treatment (SAFE-T)    Amount of exercise or physical activity: None    Problems taking medications regularly: No    Medication side effects: none    Diet: regular (no restrictions)      Reviewed and updated as needed this visit by Provider  Tobacco  Allergies  Meds  Problems  Med Hx  Surg Hx  Fam Hx         Review of Systems   ROS COMP: Constitutional, HEENT, cardiovascular, pulmonary, gi and gu systems are negative, except as otherwise noted.      Objective    /62 (BP Location: Right arm, Cuff Size: Adult Large)   Pulse 78   Temp 98.4  F (36.9  C) (Oral)   Ht 1.626 m (5' 4\")   Wt 96.2 kg (212 lb)   SpO2 97%   BMI 36.39 kg/m    Body mass index is " 36.39 kg/m .  Physical Exam   NA    Diagnostic Test Results:  none         Assessment & Plan     1. Major depressive disorder, recurrent episode, moderate (H)  Uncontrolled - also with diffuse body aches - patient thinks she has fibromyalgia.  Recommend direct switch to duloxetine.  I don't know if this will be covered by insurance or not. Declines therapy at this time due to cost  - DULoxetine (CYMBALTA) 60 MG capsule; Take 1 capsule (60 mg) by mouth daily  Dispense: 90 capsule; Refill: 0    2. Generalized body aches  See #1.  - DULoxetine (CYMBALTA) 60 MG capsule; Take 1 capsule (60 mg) by mouth daily  Dispense: 90 capsule; Refill: 0    3. Ulcerative pancolitis with complication (H)  Follows yearly with GI.           Patient Instructions   STOP the Effexor and START cymbalta (duloxetine).    Follow-up in 6 weeks.  Can do as physical.      Return in about 6 weeks (around 8/2/2019) for Physical Exam, Depression/Anxiety.    Teagan Ann MD  [facility]       yes

## 2019-07-26 DIAGNOSIS — R61 GENERALIZED HYPERHIDROSIS: ICD-10-CM

## 2019-07-26 NOTE — TELEPHONE ENCOUNTER
"Requested Prescriptions   Pending Prescriptions Disp Refills     cloNIDine (CATAPRES) 0.2 MG tablet [Pharmacy Med Name: CLONIDINE 0.2MG TABLETS]  Last Written Prescription Date:  08/06/2018  Last Fill Quantity: 180 tablet,  # refills: 3   Last Office Visit: 6/21/2019 Teagan Ann MD  Future Office Visit:    Next 5 appointments (look out 90 days)    Aug 16, 2019  1:10 PM CDT  Adult Preventative Visit with Teagan Ann MD, Ea Rn Pal 3a, EA EXAM ROOM 33  [facility] [address] [place] [address]  [address]  [facility] [address] [phone]          180 tablet 0     Sig: TAKE 2 TABLETS(0.4 MG) BY MOUTH AT BEDTIME       Central Acting Antiadrenergic Agents Passed - 7/26/2019  3:12 PM        Passed - Blood pressure under 140/90 in past 12 months     BP Readings from Last 3 Encounters:   06/21/19 124/62   11/20/18 122/64   11/09/18 152/78             Passed - Patient is 6 years of age or older        Passed - Recent (12 mo) or future (30 days) visit within the authorizing provider's specialty     Patient had office visit in the last 12 months or has a visit in the next 30 days with authorizing provider or within the authorizing provider's specialty.  See \"Patient Info\" tab in inbasket, or \"Choose Columns\" in Meds & Orders section of the refill encounter.            Passed - Medication is active on med list        Passed - Patient not pregnant        Passed - Normal serum creatinine on file within past 12 months     Recent Labs   Lab Test 08/30/18  1444   CR 0.84             Passed - No positive pregnancy test on file in past 12 months          "

## 2019-07-28 RX ORDER — CLONIDINE HYDROCHLORIDE 0.2 MG/1
TABLET ORAL
Qty: 180 TABLET | Refills: 0 | Status: SHIPPED | OUTPATIENT
Start: 2019-07-28 | End: 2019-08-16

## 2019-07-28 NOTE — TELEPHONE ENCOUNTER
Prescription approved per [facility] Refill Protocol.  Emma Vines RN

## 2019-08-06 DIAGNOSIS — R52 GENERALIZED BODY ACHES: ICD-10-CM

## 2019-08-06 DIAGNOSIS — R61 GENERALIZED HYPERHIDROSIS: ICD-10-CM

## 2019-08-06 DIAGNOSIS — F33.1 MAJOR DEPRESSIVE DISORDER, RECURRENT EPISODE, MODERATE (H): ICD-10-CM

## 2019-08-06 DIAGNOSIS — I10 BENIGN ESSENTIAL HYPERTENSION: ICD-10-CM

## 2019-08-06 RX ORDER — LISINOPRIL AND HYDROCHLOROTHIAZIDE 12.5; 2 MG/1; MG/1
2 TABLET ORAL DAILY
Qty: 180 TABLET | Refills: 2 | Status: CANCELLED | OUTPATIENT
Start: 2019-08-06

## 2019-08-06 RX ORDER — DULOXETIN HYDROCHLORIDE 60 MG/1
60 CAPSULE, DELAYED RELEASE ORAL DAILY
Qty: 90 CAPSULE | Refills: 0 | Status: CANCELLED | OUTPATIENT
Start: 2019-08-06

## 2019-08-08 DIAGNOSIS — R52 GENERALIZED BODY ACHES: ICD-10-CM

## 2019-08-08 DIAGNOSIS — I10 BENIGN ESSENTIAL HYPERTENSION: ICD-10-CM

## 2019-08-08 DIAGNOSIS — F33.1 MAJOR DEPRESSIVE DISORDER, RECURRENT EPISODE, MODERATE (H): ICD-10-CM

## 2019-08-08 DIAGNOSIS — Z13.6 SCREENING FOR CARDIOVASCULAR CONDITION: ICD-10-CM

## 2019-08-08 LAB
ALBUMIN SERPL-MCNC: 3.5 G/DL (ref 3.4–5)
ALP SERPL-CCNC: 89 U/L (ref 40–150)
ALT SERPL W P-5'-P-CCNC: 48 U/L (ref 0–50)
ANION GAP SERPL CALCULATED.3IONS-SCNC: 6 MMOL/L (ref 3–14)
AST SERPL W P-5'-P-CCNC: 24 U/L (ref 0–45)
BILIRUB SERPL-MCNC: 0.4 MG/DL (ref 0.2–1.3)
BUN SERPL-MCNC: 23 MG/DL (ref 7–30)
CALCIUM SERPL-MCNC: 8.5 MG/DL (ref 8.5–10.1)
CHLORIDE SERPL-SCNC: 103 MMOL/L (ref 94–109)
CHOLEST SERPL-MCNC: 172 MG/DL
CO2 SERPL-SCNC: 31 MMOL/L (ref 20–32)
CREAT SERPL-MCNC: 0.94 MG/DL (ref 0.52–1.04)
GFR SERPL CREATININE-BSD FRML MDRD: 64 ML/MIN/{1.73_M2}
GLUCOSE SERPL-MCNC: 120 MG/DL (ref 70–99)
HDLC SERPL-MCNC: 38 MG/DL
LDLC SERPL CALC-MCNC: 93 MG/DL
NONHDLC SERPL-MCNC: 134 MG/DL
POTASSIUM SERPL-SCNC: 3.7 MMOL/L (ref 3.4–5.3)
PROT SERPL-MCNC: 7.1 G/DL (ref 6.8–8.8)
SODIUM SERPL-SCNC: 140 MMOL/L (ref 133–144)
TRIGL SERPL-MCNC: 207 MG/DL

## 2019-08-08 PROCEDURE — 36415 COLL VENOUS BLD VENIPUNCTURE: CPT | Performed by: PEDIATRICS

## 2019-08-08 PROCEDURE — 80061 LIPID PANEL: CPT | Performed by: PEDIATRICS

## 2019-08-08 PROCEDURE — 80053 COMPREHEN METABOLIC PANEL: CPT | Performed by: PEDIATRICS

## 2019-08-08 RX ORDER — CLONIDINE HYDROCHLORIDE 0.2 MG/1
TABLET ORAL
Qty: 180 TABLET | Refills: 0 | OUTPATIENT
Start: 2019-08-08

## 2019-08-08 RX ORDER — LISINOPRIL AND HYDROCHLOROTHIAZIDE 12.5; 2 MG/1; MG/1
2 TABLET ORAL DAILY
Qty: 180 TABLET | Refills: 3 | Status: SHIPPED | OUTPATIENT
Start: 2019-08-08

## 2019-08-08 RX ORDER — DULOXETIN HYDROCHLORIDE 60 MG/1
60 CAPSULE, DELAYED RELEASE ORAL DAILY
Qty: 90 CAPSULE | Refills: 1 | Status: SHIPPED | OUTPATIENT
Start: 2019-08-08

## 2019-08-10 DIAGNOSIS — I10 BENIGN ESSENTIAL HYPERTENSION: ICD-10-CM

## 2019-08-10 NOTE — TELEPHONE ENCOUNTER
"Requested Prescriptions   Pending Prescriptions Disp Refills     lisinopril-hydrochlorothiazide (PRINZIDE/ZESTORETIC) 20-12.5 MG tablet [Pharmacy Med Name: LISINOPRIL-HCTZ 20/12.5MG TABLETS]  Last Written Prescription Date:  08/08/2019  Last Fill Quantity: 180 tablet,  # refills: 3   Last Office Visit: 6/21/2019 Teagan Ann MD   Future Office Visit:    Next 5 appointments (look out 90 days)    Aug 16, 2019  1:10 PM CDT  Adult Preventative Visit with Teagan Ann MD, Ea Rn Pal 3a, EA EXAM ROOM 33  [facility] [address] [place] [address]  [address]  [facility] [id number] [phone]          180 tablet 0     Sig: TAKE 2 TABLETS BY MOUTH DAILY       Diuretics (Including Combos) Protocol Passed - 8/10/2019  5:08 AM        Passed - Blood pressure under 140/90 in past 12 months     BP Readings from Last 3 Encounters:   06/21/19 124/62   11/20/18 122/64   11/09/18 152/78                 Passed - Recent (12 mo) or future (30 days) visit within the authorizing provider's specialty     Patient had office visit in the last 12 months or has a visit in the next 30 days with authorizing provider or within the authorizing provider's specialty.  See \"Patient Info\" tab in inbasket, or \"Choose Columns\" in Meds & Orders section of the refill encounter.              Passed - Medication is active on med list        Passed - Patient is age 18 or older        Passed - No active pregancy on record        Passed - Normal serum creatinine on file in past 12 months     Recent Labs   Lab Test 08/08/19 0719   CR 0.94              Passed - Normal serum potassium on file in past 12 months     Recent Labs   Lab Test 08/08/19 0719   POTASSIUM 3.7                    Passed - Normal serum sodium on file in past 12 months     Recent Labs   Lab Test 08/08/19 0719                 Passed - No positive pregnancy test in past 12 months          "

## 2019-08-12 RX ORDER — LISINOPRIL AND HYDROCHLOROTHIAZIDE 12.5; 2 MG/1; MG/1
TABLET ORAL
Qty: 180 TABLET | Refills: 0 | OUTPATIENT
Start: 2019-08-12

## 2019-08-12 NOTE — TELEPHONE ENCOUNTER
Requested Rx was sent to [facility] pharmacy on 8/8/19. Message sent to requesting pharmacy to confirm with pt where she wants this order filled (mail order as sent, or change to local retail).  ILDA MclaughlinN, RN

## 2019-08-16 ENCOUNTER — ANCILLARY PROCEDURE (OUTPATIENT)
Dept: MAMMOGRAPHY | Facility: CLINIC | Age: 64
End: 2019-08-16
Attending: PEDIATRICS
Payer: COMMERCIAL

## 2019-08-16 ENCOUNTER — OFFICE VISIT (OUTPATIENT)
Dept: PEDIATRICS | Facility: CLINIC | Age: 64
End: 2019-08-16
Payer: COMMERCIAL

## 2019-08-16 VITALS
SYSTOLIC BLOOD PRESSURE: 110 MMHG | RESPIRATION RATE: 16 BRPM | HEART RATE: 68 BPM | BODY MASS INDEX: 37.39 KG/M2 | DIASTOLIC BLOOD PRESSURE: 68 MMHG | WEIGHT: 219 LBS | OXYGEN SATURATION: 96 % | TEMPERATURE: 98 F | HEIGHT: 64 IN

## 2019-08-16 DIAGNOSIS — F33.1 MAJOR DEPRESSIVE DISORDER, RECURRENT EPISODE, MODERATE (H): Primary | ICD-10-CM

## 2019-08-16 DIAGNOSIS — R61 GENERALIZED HYPERHIDROSIS: ICD-10-CM

## 2019-08-16 DIAGNOSIS — M15.8 OTHER OSTEOARTHRITIS INVOLVING MULTIPLE JOINTS: ICD-10-CM

## 2019-08-16 DIAGNOSIS — Z12.31 VISIT FOR SCREENING MAMMOGRAM: ICD-10-CM

## 2019-08-16 PROCEDURE — 77067 SCR MAMMO BI INCL CAD: CPT | Mod: TC

## 2019-08-16 PROCEDURE — 99213 OFFICE O/P EST LOW 20 MIN: CPT | Performed by: PEDIATRICS

## 2019-08-16 RX ORDER — MULTIVITAMIN,THERAPEUTIC
1 TABLET ORAL DAILY
COMMUNITY

## 2019-08-16 RX ORDER — CLONIDINE HYDROCHLORIDE 0.2 MG/1
TABLET ORAL
Qty: 180 TABLET | Refills: 3 | Status: SHIPPED | OUTPATIENT
Start: 2019-08-16

## 2019-08-16 SDOH — ECONOMIC STABILITY: FOOD INSECURITY: WITHIN THE PAST 12 MONTHS, THE FOOD YOU BOUGHT JUST DIDN'T LAST AND YOU DIDN'T HAVE MONEY TO GET MORE.: NEVER TRUE

## 2019-08-16 SDOH — SOCIAL STABILITY: SOCIAL NETWORK: ARE YOU MARRIED, WIDOWED, DIVORCED, SEPARATED, NEVER MARRIED, OR LIVING WITH A PARTNER?: DIVORCED

## 2019-08-16 SDOH — ECONOMIC STABILITY: INCOME INSECURITY: HOW HARD IS IT FOR YOU TO PAY FOR THE VERY BASICS LIKE FOOD, HOUSING, MEDICAL CARE, AND HEATING?: SOMEWHAT HARD

## 2019-08-16 SDOH — HEALTH STABILITY: PHYSICAL HEALTH: ON AVERAGE, HOW MANY MINUTES DO YOU ENGAGE IN EXERCISE AT THIS LEVEL?: 20 MIN

## 2019-08-16 SDOH — SOCIAL STABILITY: SOCIAL NETWORK
IN A TYPICAL WEEK, HOW MANY TIMES DO YOU TALK ON THE PHONE WITH FAMILY, FRIENDS, OR NEIGHBORS?: MORE THAN THREE TIMES A WEEK

## 2019-08-16 SDOH — HEALTH STABILITY: PHYSICAL HEALTH: ON AVERAGE, HOW MANY DAYS PER WEEK DO YOU ENGAGE IN MODERATE TO STRENUOUS EXERCISE (LIKE A BRISK WALK)?: 3 DAYS

## 2019-08-16 SDOH — SOCIAL STABILITY: SOCIAL NETWORK: HOW OFTEN DO YOU ATTENT MEETINGS OF THE CLUB OR ORGANIZATION YOU BELONG TO?: PATIENT DECLINED

## 2019-08-16 SDOH — SOCIAL STABILITY: SOCIAL NETWORK
DO YOU BELONG TO ANY CLUBS OR ORGANIZATIONS SUCH AS CHURCH GROUPS UNIONS, FRATERNAL OR ATHLETIC GROUPS, OR SCHOOL GROUPS?: PATIENT DECLINED

## 2019-08-16 SDOH — HEALTH STABILITY: MENTAL HEALTH: HOW MANY STANDARD DRINKS CONTAINING ALCOHOL DO YOU HAVE ON A TYPICAL DAY?: 1 OR 2

## 2019-08-16 SDOH — ECONOMIC STABILITY: FOOD INSECURITY: WITHIN THE PAST 12 MONTHS, YOU WORRIED THAT YOUR FOOD WOULD RUN OUT BEFORE YOU GOT MONEY TO BUY MORE.: SOMETIMES TRUE

## 2019-08-16 SDOH — SOCIAL STABILITY: SOCIAL NETWORK: HOW OFTEN DO YOU ATTEND CHURCH OR RELIGIOUS SERVICES?: PATIENT DECLINED

## 2019-08-16 SDOH — HEALTH STABILITY: MENTAL HEALTH
STRESS IS WHEN SOMEONE FEELS TENSE, NERVOUS, ANXIOUS, OR CAN'T SLEEP AT NIGHT BECAUSE THEIR MIND IS TROUBLED. HOW STRESSED ARE YOU?: NOT AT ALL

## 2019-08-16 SDOH — HEALTH STABILITY: MENTAL HEALTH: HOW OFTEN DO YOU HAVE A DRINK CONTAINING ALCOHOL?: 2-4 TIMES A MONTH

## 2019-08-16 SDOH — ECONOMIC STABILITY: TRANSPORTATION INSECURITY
IN THE PAST 12 MONTHS, HAS THE LACK OF TRANSPORTATION KEPT YOU FROM MEDICAL APPOINTMENTS OR FROM GETTING MEDICATIONS?: NO

## 2019-08-16 SDOH — ECONOMIC STABILITY: TRANSPORTATION INSECURITY
IN THE PAST 12 MONTHS, HAS LACK OF TRANSPORTATION KEPT YOU FROM MEETINGS, WORK, OR FROM GETTING THINGS NEEDED FOR DAILY LIVING?: NO

## 2019-08-16 ASSESSMENT — ENCOUNTER SYMPTOMS
FEVER: 0
DIARRHEA: 0
ABDOMINAL PAIN: 0
NAUSEA: 0
FREQUENCY: 1
BREAST MASS: 0
WEAKNESS: 0
DYSURIA: 0
MYALGIAS: 1
JOINT SWELLING: 0
NERVOUS/ANXIOUS: 0
CONSTIPATION: 0
HEMATOCHEZIA: 0
EYE PAIN: 0
COUGH: 0
SHORTNESS OF BREATH: 0
CHILLS: 0
ARTHRALGIAS: 0
HEMATURIA: 0
SORE THROAT: 0
HEARTBURN: 1
DIZZINESS: 0
HEADACHES: 0
PARESTHESIAS: 0
PALPITATIONS: 0

## 2019-08-16 ASSESSMENT — PATIENT HEALTH QUESTIONNAIRE - PHQ9
SUM OF ALL RESPONSES TO PHQ QUESTIONS 1-9: 6
5. POOR APPETITE OR OVEREATING: NOT AT ALL

## 2019-08-16 ASSESSMENT — ANXIETY QUESTIONNAIRES
1. FEELING NERVOUS, ANXIOUS, OR ON EDGE: NOT AT ALL
5. BEING SO RESTLESS THAT IT IS HARD TO SIT STILL: NOT AT ALL
7. FEELING AFRAID AS IF SOMETHING AWFUL MIGHT HAPPEN: NOT AT ALL
2. NOT BEING ABLE TO STOP OR CONTROL WORRYING: NOT AT ALL
GAD7 TOTAL SCORE: 2
IF YOU CHECKED OFF ANY PROBLEMS ON THIS QUESTIONNAIRE, HOW DIFFICULT HAVE THESE PROBLEMS MADE IT FOR YOU TO DO YOUR WORK, TAKE CARE OF THINGS AT HOME, OR GET ALONG WITH OTHER PEOPLE: NOT DIFFICULT AT ALL
6. BECOMING EASILY ANNOYED OR IRRITABLE: SEVERAL DAYS
3. WORRYING TOO MUCH ABOUT DIFFERENT THINGS: SEVERAL DAYS

## 2019-08-16 ASSESSMENT — ACTIVITIES OF DAILY LIVING (ADL): CURRENT_FUNCTION: NO ASSISTANCE NEEDED

## 2019-08-16 ASSESSMENT — MIFFLIN-ST. JEOR: SCORE: 1523.38

## 2019-08-16 NOTE — PATIENT INSTRUCTIONS
Continue with Cymbalta.    Follow-up for physical anytime after 11-9-19.    Refills sent to pharmacy.

## 2019-08-16 NOTE — PROGRESS NOTES
"Subjective     Lilia Lilly is a 64 year old female who presents to clinic today for the following health issues:    Healthy Habits:     In general, how would you rate your overall health?  Good    Frequency of exercise:  2-3 days/week    Duration of exercise:  15-30 minutes    Do you usually eat at least 4 servings of fruit and vegetables a day, include whole grains    & fiber and avoid regularly eating high fat or \"junk\" foods?  Yes    Taking medications regularly:  Yes    Medication side effects:  Other    Ability to successfully perform activities of daily living:  No assistance needed    Home Safety:  No safety concerns identified    Hearing Impairment:  Difficulty following a conversation in a noisy restaurant or crowded room and need to ask people to speak up or repeat themselves    In the past 6 months, have you been bothered by leaking of urine? Yes    In general, how would you rate your overall mental or emotional health?  Good      PHQ-2 Total Score: 0    Additional concerns today:  No     Too early to do physical today    Depression and Anxiety Follow-Up    How are you doing with your depression since your last visit? Improved - start Cymbalta at last visit and patient doing very well. Also helping pain.  She is staying very active with grandchildren - lots of lake and splash pads.     How are you doing with your anxiety since your last visit?  Improved     Are you having other symptoms that might be associated with depression or anxiety? No    Have you had a significant life event? No     Do you have any concerns with your use of alcohol or other drugs? No    Social History     Tobacco Use     Smoking status: Former Smoker     Types: Cigarettes     Last attempt to quit: 2017     Years since quittin.0     Smokeless tobacco: Never Used   Substance Use Topics     Alcohol use: Yes     Frequency: 2-4 times a month     Drinks per session: 1 or 2     Comment: social drinker     Drug use: No     PHQ " "11/9/2018 6/21/2019 8/16/2019   PHQ-9 Total Score 8 13 6   Q9: Thoughts of better off dead/self-harm past 2 weeks Not at all Not at all Not at all     BING-7 SCORE 7/27/2018 8/16/2019   Total Score 8 2       Reviewed and updated as needed this visit by Provider         Review of Systems   Constitutional: Negative for chills and fever.   HENT: Positive for congestion and hearing loss. Negative for ear pain and sore throat.    Eyes: Negative for pain and visual disturbance.   Respiratory: Negative for cough and shortness of breath.    Cardiovascular: Negative for chest pain, palpitations and peripheral edema.   Gastrointestinal: Positive for heartburn. Negative for abdominal pain, constipation, diarrhea, hematochezia and nausea.   Breasts:  Negative for tenderness, breast mass and discharge.   Genitourinary: Positive for frequency. Negative for dysuria, genital sores, hematuria, pelvic pain, urgency, vaginal bleeding and vaginal discharge.   Musculoskeletal: Positive for myalgias. Negative for arthralgias and joint swelling.   Skin: Negative for rash.   Neurological: Negative for dizziness, weakness, headaches and paresthesias.   Psychiatric/Behavioral: Negative for mood changes. The patient is not nervous/anxious.             Objective    /68 (BP Location: Right arm, Cuff Size: Adult Large)   Pulse 68   Temp 98  F (36.7  C) (Oral)   Resp 16   Ht 1.626 m (5' 4\")   Wt 99.3 kg (219 lb)   SpO2 96%   BMI 37.59 kg/m    Body mass index is 37.59 kg/m .  Physical Exam   GENERAL APPEARANCE: healthy, alert and no distress  CV: regular rates and rhythm, normal S1 S2, no S3 or S4 and no murmur, click or rub    Diagnostic Test Results:  none         Assessment & Plan     1. Depression -  Much improved.  Continue Cymbalta and follow up in 6 months.     2. Generalized hyperhidrosis  Controlled - continue  - cloNIDine (CATAPRES) 0.2 MG tablet; TAKE 2 TABLETS(0.4 MG) BY MOUTH AT BEDTIME  Dispense: 180 tablet; Refill: " 3    3. Other osteoarthritis involving multiple joints  refill  - diclofenac (VOLTAREN) 1 % topical gel; Apply 4 grams to knees or 2 grams to hands four times daily using enclosed dosing card.  Dispense: 100 g; Refill: 1         Patient Instructions   Continue with Cymbalta.    Follow-up for physical anytime after 11-9-19.    Refills sent to pharmacy.        Return in about 3 months (around 11/16/2019) for Annual Wellness Visit - anytime after 11-9-18.    Teagan Ann MD  [facility]

## 2019-08-17 ASSESSMENT — ANXIETY QUESTIONNAIRES: GAD7 TOTAL SCORE: 2

## 2019-10-03 ENCOUNTER — HEALTH MAINTENANCE LETTER (OUTPATIENT)
Age: 64
End: 2019-10-03

## 2019-10-31 ENCOUNTER — HEALTH MAINTENANCE LETTER (OUTPATIENT)
Age: 64
End: 2019-10-31

## 2019-11-08 DIAGNOSIS — R61 GENERALIZED HYPERHIDROSIS: ICD-10-CM

## 2019-11-08 RX ORDER — CLONIDINE HYDROCHLORIDE 0.2 MG/1
TABLET ORAL
Qty: 180 TABLET | Refills: 0 | OUTPATIENT
Start: 2019-11-08

## 2019-11-08 NOTE — TELEPHONE ENCOUNTER
Not due for a refill.     cloNIDine (CATAPRES) 0.2 MG tablet was filled on 8/16/2019, qty 180 with 3 refills.

## 2019-11-08 NOTE — TELEPHONE ENCOUNTER
Refill not appropriate, should not be needed at this time.  Medication was refilled on 8/16/19 x 1 year supply and sent to mail order pharmacy, [facility].  Current request coming from local [facility] pharmacy. Appears all of patient's meds filled with mail order.   Request denied to [facility] for this reason.    Fadia Anthony RN  [facility]/ [facility]

## 2020-02-08 ENCOUNTER — HEALTH MAINTENANCE LETTER (OUTPATIENT)
Age: 65
End: 2020-02-08

## 2020-11-07 ENCOUNTER — HEALTH MAINTENANCE LETTER (OUTPATIENT)
Age: 65
End: 2020-11-07

## 2021-02-11 ENCOUNTER — TELEPHONE (OUTPATIENT)
Dept: PEDIATRICS | Facility: CLINIC | Age: 66
End: 2021-02-11

## 2021-02-11 NOTE — TELEPHONE ENCOUNTER
Patient Quality Outreach      Summary:    Patient has the following on her problem list/HM:     Depression / Dysthymia review           PHQ-9 SCORE 11/9/2018 6/21/2019 8/16/2019   PHQ-9 Total Score MyChart 8 (Mild depression) - -   PHQ-9 Total Score 8 13 6       If PHQ-9 recheck is 5 or more, route to provider for next steps.    Patient is due/failing the following:   PHQ-9 Needed and Depression follow-up visit    Type of outreach:    Sent [employer] message.    Questions for provider review:    None                                                                                                                                     TIAGO ESTRADA MA on 2/11/2021 at 2:18 PM

## 2021-03-21 ENCOUNTER — HEALTH MAINTENANCE LETTER (OUTPATIENT)
Age: 66
End: 2021-03-21

## 2021-08-24 ENCOUNTER — TELEPHONE (OUTPATIENT)
Dept: PEDIATRICS | Facility: CLINIC | Age: 66
End: 2021-08-24

## 2021-08-24 NOTE — TELEPHONE ENCOUNTER
Patient Quality Outreach      Summary:    Patient has the following on her problem list/HM:     Depression / Dysthymia review           PHQ-9 SCORE 11/9/2018 6/21/2019 8/16/2019   PHQ-9 Total Score MyChart 8 (Mild depression) - -   PHQ-9 Total Score 8 13 6       If PHQ-9 recheck is 5 or more, route to provider for next steps.    Patient is due/failing the following:   PHQ-9 Needed    Type of outreach:    Phone, left message for patient/parent to call back.    Questions for provider review:    None                                                                                                                                     Tammie Lawson CMA on 8/24/2021 at 2:23 PM

## 2021-09-05 ENCOUNTER — HEALTH MAINTENANCE LETTER (OUTPATIENT)
Age: 66
End: 2021-09-05

## 2021-10-31 ENCOUNTER — HEALTH MAINTENANCE LETTER (OUTPATIENT)
Age: 66
End: 2021-10-31

## 2022-04-17 ENCOUNTER — HEALTH MAINTENANCE LETTER (OUTPATIENT)
Age: 67
End: 2022-04-17

## 2022-10-23 ENCOUNTER — HEALTH MAINTENANCE LETTER (OUTPATIENT)
Age: 67
End: 2022-10-23

## 2023-06-01 ENCOUNTER — HEALTH MAINTENANCE LETTER (OUTPATIENT)
Age: 68
End: 2023-06-01

## 2023-11-05 ENCOUNTER — HEALTH MAINTENANCE LETTER (OUTPATIENT)
Age: 68
End: 2023-11-05

## 2024-06-02 ENCOUNTER — HEALTH MAINTENANCE LETTER (OUTPATIENT)
Age: 69
End: 2024-06-02